# Patient Record
Sex: MALE | Race: BLACK OR AFRICAN AMERICAN | Employment: OTHER | URBAN - NONMETROPOLITAN AREA
[De-identification: names, ages, dates, MRNs, and addresses within clinical notes are randomized per-mention and may not be internally consistent; named-entity substitution may affect disease eponyms.]

---

## 2023-10-12 ENCOUNTER — HOSPITAL ENCOUNTER (EMERGENCY)
Facility: HOSPITAL | Age: 88
Discharge: HOME | End: 2023-10-12
Attending: STUDENT IN AN ORGANIZED HEALTH CARE EDUCATION/TRAINING PROGRAM
Payer: OTHER GOVERNMENT

## 2023-10-12 VITALS
SYSTOLIC BLOOD PRESSURE: 184 MMHG | TEMPERATURE: 98.3 F | HEART RATE: 84 BPM | DIASTOLIC BLOOD PRESSURE: 72 MMHG | RESPIRATION RATE: 19 BRPM | OXYGEN SATURATION: 97 %

## 2023-10-12 DIAGNOSIS — L03.116 CELLULITIS OF LEFT LOWER EXTREMITY: Primary | ICD-10-CM

## 2023-10-12 LAB
ALBUMIN SERPL-MCNC: 3.3 G/DL (ref 3.5–5)
ALBUMIN/GLOB SERPL: 0.8 (ref 1.1–2.2)
ALP SERPL-CCNC: 87 U/L (ref 45–117)
ALT SERPL-CCNC: 21 U/L (ref 12–78)
ANION GAP SERPL CALC-SCNC: 5 MMOL/L (ref 5–15)
AST SERPL W P-5'-P-CCNC: 21 U/L (ref 15–37)
BASOPHILS # BLD: 0 K/UL (ref 0–0.1)
BASOPHILS NFR BLD: 1 % (ref 0–1)
BILIRUB SERPL-MCNC: 0.7 MG/DL (ref 0.2–1)
BUN SERPL-MCNC: 21 MG/DL (ref 6–20)
BUN/CREAT SERPL: 21 (ref 12–20)
CA-I BLD-MCNC: 9.1 MG/DL (ref 8.5–10.1)
CHLORIDE SERPL-SCNC: 101 MMOL/L (ref 97–108)
CO2 SERPL-SCNC: 33 MMOL/L (ref 21–32)
CREAT SERPL-MCNC: 1.01 MG/DL (ref 0.7–1.3)
DIFFERENTIAL METHOD BLD: ABNORMAL
EOSINOPHIL # BLD: 0.3 K/UL (ref 0–0.4)
EOSINOPHIL NFR BLD: 7 % (ref 0–7)
ERYTHROCYTE [DISTWIDTH] IN BLOOD BY AUTOMATED COUNT: 16.4 % (ref 11.5–14.5)
GLOBULIN SER CALC-MCNC: 4.3 G/DL (ref 2–4)
GLUCOSE SERPL-MCNC: 115 MG/DL (ref 65–100)
HCT VFR BLD AUTO: 35.6 % (ref 36.6–50.3)
HGB BLD-MCNC: 10.9 G/DL (ref 12.1–17)
IMM GRANULOCYTES # BLD AUTO: 0 K/UL (ref 0–0.04)
IMM GRANULOCYTES NFR BLD AUTO: 0 % (ref 0–0.5)
LYMPHOCYTES # BLD: 1.3 K/UL (ref 0.8–3.5)
LYMPHOCYTES NFR BLD: 28 % (ref 12–49)
MCH RBC QN AUTO: 26.5 PG (ref 26–34)
MCHC RBC AUTO-ENTMCNC: 30.6 G/DL (ref 30–36.5)
MCV RBC AUTO: 86.6 FL (ref 80–99)
MONOCYTES # BLD: 0.4 K/UL (ref 0–1)
MONOCYTES NFR BLD: 10 % (ref 5–13)
NEUTS SEG # BLD: 2.4 K/UL (ref 1.8–8)
NEUTS SEG NFR BLD: 54 % (ref 32–75)
NRBC # BLD: 0 K/UL (ref 0–0.01)
NRBC BLD-RTO: 0 PER 100 WBC
PLATELET # BLD AUTO: 291 K/UL (ref 150–400)
PMV BLD AUTO: 9.7 FL (ref 8.9–12.9)
POTASSIUM SERPL-SCNC: 4.5 MMOL/L (ref 3.5–5.1)
PROT SERPL-MCNC: 7.6 G/DL (ref 6.4–8.2)
RBC # BLD AUTO: 4.11 M/UL (ref 4.1–5.7)
SODIUM SERPL-SCNC: 139 MMOL/L (ref 136–145)
WBC # BLD AUTO: 4.5 K/UL (ref 4.1–11.1)

## 2023-10-12 PROCEDURE — 36415 COLL VENOUS BLD VENIPUNCTURE: CPT

## 2023-10-12 PROCEDURE — 80053 COMPREHEN METABOLIC PANEL: CPT

## 2023-10-12 PROCEDURE — 85025 COMPLETE CBC W/AUTO DIFF WBC: CPT

## 2023-10-12 PROCEDURE — 99283 EMERGENCY DEPT VISIT LOW MDM: CPT

## 2023-10-12 RX ORDER — CEPHALEXIN 500 MG/1
500 CAPSULE ORAL 3 TIMES DAILY
Qty: 30 CAPSULE | Refills: 0 | Status: SHIPPED | OUTPATIENT
Start: 2023-10-12 | End: 2023-10-22

## 2023-10-12 ASSESSMENT — PAIN - FUNCTIONAL ASSESSMENT: PAIN_FUNCTIONAL_ASSESSMENT: 0-10

## 2023-10-12 ASSESSMENT — PAIN SCALES - GENERAL: PAINLEVEL_OUTOF10: 0

## 2023-10-12 NOTE — DISCHARGE INSTRUCTIONS
Thank you! Thank you for allowing me to care for you in the emergency department. It is my goal to provide you with excellent care. If you have not received excellent quality care, please ask to speak to the nurse manager. Please fill out the survey that will come to you by mail or email since we listen to your feedback! Below you will find a list of your tests from today's visit. Should you have any questions, please do not hesitate to call the emergency department.     Labs  Recent Results (from the past 12 hour(s))   CMP    Collection Time: 10/12/23  5:49 PM   Result Value Ref Range    Sodium 139 136 - 145 mmol/L    Potassium 4.5 3.5 - 5.1 mmol/L    Chloride 101 97 - 108 mmol/L    CO2 33 (H) 21 - 32 mmol/L    Anion Gap 5 5 - 15 mmol/L    Glucose 115 (H) 65 - 100 mg/dL    BUN 21 (H) 6 - 20 mg/dL    Creatinine 1.01 0.70 - 1.30 mg/dL    Bun/Cre Ratio 21 (H) 12 - 20      Est, Glom Filt Rate >60 >60 ml/min/1.73m2    Calcium 9.1 8.5 - 10.1 mg/dL    Total Bilirubin 0.7 0.2 - 1.0 mg/dL    AST 21 15 - 37 U/L    ALT 21 12 - 78 U/L    Alk Phosphatase 87 45 - 117 U/L    Total Protein 7.6 6.4 - 8.2 g/dL    Albumin 3.3 (L) 3.5 - 5.0 g/dL    Globulin 4.3 (H) 2.0 - 4.0 g/dL    Albumin/Globulin Ratio 0.8 (L) 1.1 - 2.2     CBC with Auto Differential    Collection Time: 10/12/23  5:49 PM   Result Value Ref Range    WBC 4.5 4.1 - 11.1 K/uL    RBC 4.11 4.10 - 5.70 M/uL    Hemoglobin 10.9 (L) 12.1 - 17.0 g/dL    Hematocrit 35.6 (L) 36.6 - 50.3 %    MCV 86.6 80.0 - 99.0 FL    MCH 26.5 26.0 - 34.0 PG    MCHC 30.6 30.0 - 36.5 g/dL    RDW 16.4 (H) 11.5 - 14.5 %    Platelets 474 885 - 653 K/uL    MPV 9.7 8.9 - 12.9 FL    Nucleated RBCs 0.0 0.0  WBC    nRBC 0.00 0.00 - 0.01 K/uL    Neutrophils % 54 32 - 75 %    Lymphocytes % 28 12 - 49 %    Monocytes % 10 5 - 13 %    Eosinophils % 7 0 - 7 %    Basophils % 1 0 - 1 %    Immature Granulocytes 0 0 - 0.5 %    Neutrophils Absolute 2.4 1.8 - 8.0 K/UL    Lymphocytes Absolute 1.3

## 2023-10-12 NOTE — ED PROVIDER NOTES
[facility] EMERGENCY DEPT  EMERGENCY DEPARTMENT HISTORY AND PHYSICAL EXAM      Date: 10/12/2023  Patient Name: Taj Waterman  MRN: [id number]  YOB: 1935  Date of evaluation: 10/12/2023  Provider: Giorgio Madrid MD   Note Started: 5:06 PM EDT 10/12/23    HISTORY OF PRESENT ILLNESS     Chief Complaint   Patient presents with    Leg Pain       History Provided By: Patient    HPI: Taj Waterman is a 81 y.o. male with history of heart failure and chronic lymphedema presents with bilateral lower extremity edema. Patient reports tenderness of both legs. He reports that there is some weeping of his left lower extremity that is causing his stocking to stick to his leg. He denies any fevers, chest pain, dyspnea. PAST MEDICAL HISTORY   Past Medical History:  No past medical history on file. Past Surgical History:  No past surgical history on file. Family History:  No family history on file. Social History: Allergies:  No Known Allergies    PCP: Nicci Sykes MD    Current Meds:   No current facility-administered medications for this encounter. No current outpatient medications on file. Social Determinants of Health:   Social Determinants of Health     Tobacco Use: Not on file   Alcohol Use: Not on file   Financial Resource Strain: Not on file   Food Insecurity: Not on file   Transportation Needs: Not on file   Physical Activity: Not on file   Stress: Not on file   Social Connections: Not on file   Intimate Partner Violence: Not on file   Depression: Not on file   Housing Stability: Not on file       PHYSICAL EXAM   Physical Exam  Constitutional:       General: He is not in acute distress. Appearance: He is obese. He is not ill-appearing or toxic-appearing. Cardiovascular:      Pulses: Normal pulses. Pulmonary:      Effort: Pulmonary effort is normal. No respiratory distress. Breath sounds: Normal breath sounds. Musculoskeletal:         General: Tenderness present.

## 2023-10-12 NOTE — ED TRIAGE NOTES
Pt reports left leg pain from knee to foot that is worse with palpation. Pt denies pain at this time rating 0/10. Pt ambulatory to room with standing walker.

## 2023-11-10 ENCOUNTER — HOSPITAL ENCOUNTER (EMERGENCY)
Facility: HOSPITAL | Age: 88
Discharge: HOME | End: 2023-11-10
Attending: EMERGENCY MEDICINE
Payer: OTHER GOVERNMENT

## 2023-11-10 VITALS
SYSTOLIC BLOOD PRESSURE: 156 MMHG | BODY MASS INDEX: 45.81 KG/M2 | HEIGHT: 69 IN | HEART RATE: 82 BPM | WEIGHT: 309.3 LBS | DIASTOLIC BLOOD PRESSURE: 71 MMHG | RESPIRATION RATE: 16 BRPM | TEMPERATURE: 98.2 F | OXYGEN SATURATION: 99 %
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
## 2023-11-10 PROCEDURE — 99283 EMERGENCY DEPT VISIT LOW MDM: CPT

## 2023-11-10 ASSESSMENT — ENCOUNTER SYMPTOMS
GASTROINTESTINAL NEGATIVE: 1
EYES NEGATIVE: 1
ALLERGIC/IMMUNOLOGIC NEGATIVE: 1
RESPIRATORY NEGATIVE: 1

## 2023-11-10 ASSESSMENT — LIFESTYLE VARIABLES
HOW MANY STANDARD DRINKS CONTAINING ALCOHOL DO YOU HAVE ON A TYPICAL DAY: PATIENT DOES NOT DRINK
HOW OFTEN DO YOU HAVE A DRINK CONTAINING ALCOHOL: NEVER

## 2023-11-10 ASSESSMENT — PAIN - FUNCTIONAL ASSESSMENT: PAIN_FUNCTIONAL_ASSESSMENT: NONE - DENIES PAIN

## 2023-11-11 NOTE — ED TRIAGE NOTES
Patient states he slid off toilet onto floor. Pt denies any pain or injury at this time. No LOC reported.

## 2023-11-11 NOTE — ED NOTES
Patient stable at time of discharge. Education and discharge paperwork reviewed with patient and family member, both verbalize understanding of same. Patient assisted into wheelchair, exits ED with family member.       Ashely Finch RN  11/10/23 1556

## 2023-11-13 ENCOUNTER — APPOINTMENT (OUTPATIENT)
Facility: HOSPITAL | Age: 88
DRG: 871 | End: 2023-11-13
Attending: HOSPITALIST
Payer: MEDICARE

## 2023-11-13 ENCOUNTER — APPOINTMENT (OUTPATIENT)
Facility: HOSPITAL | Age: 88
DRG: 871 | End: 2023-11-13
Payer: MEDICARE

## 2023-11-13 ENCOUNTER — HOSPITAL ENCOUNTER (INPATIENT)
Facility: HOSPITAL | Age: 88
LOS: 4 days | Discharge: HOME HEALTH | DRG: 871 | End: 2023-11-17
Attending: EMERGENCY MEDICINE | Admitting: HOSPITALIST
Payer: MEDICARE

## 2023-11-13 DIAGNOSIS — L03.119 CELLULITIS OF LOWER EXTREMITY, UNSPECIFIED LATERALITY: ICD-10-CM

## 2023-11-13 DIAGNOSIS — R60.9 PERIPHERAL EDEMA: ICD-10-CM

## 2023-11-13 DIAGNOSIS — R62.7 ADULT FAILURE TO THRIVE: ICD-10-CM

## 2023-11-13 DIAGNOSIS — R53.1 GENERALIZED WEAKNESS: Primary | ICD-10-CM

## 2023-11-13 PROBLEM — I87.2 VENOUS STASIS DERMATITIS OF BOTH LOWER EXTREMITIES: Status: ACTIVE | Noted: 2023-11-13

## 2023-11-13 PROBLEM — E66.01 MORBID OBESITY WITH BMI OF 45.0-49.9, ADULT (HCC): Status: ACTIVE | Noted: 2023-11-13

## 2023-11-13 PROBLEM — Z95.0 PRESENCE OF CARDIAC PACEMAKER: Status: ACTIVE | Noted: 2020-01-01

## 2023-11-13 PROBLEM — E11.9 TYPE 2 DIABETES MELLITUS WITHOUT COMPLICATION, WITH LONG-TERM CURRENT USE OF INSULIN (HCC): Status: ACTIVE | Noted: 2023-11-13

## 2023-11-13 PROBLEM — I50.9 HEART FAILURE (HCC): Status: ACTIVE | Noted: 2023-11-13

## 2023-11-13 PROBLEM — Z79.4 TYPE 2 DIABETES MELLITUS WITHOUT COMPLICATION, WITH LONG-TERM CURRENT USE OF INSULIN (HCC): Status: ACTIVE | Noted: 2023-11-13

## 2023-11-13 LAB
ALBUMIN SERPL-MCNC: 2.7 G/DL (ref 3.5–5)
ALBUMIN/GLOB SERPL: 0.5 (ref 1.1–2.2)
ALP SERPL-CCNC: 133 U/L (ref 45–117)
ALT SERPL-CCNC: 43 U/L (ref 12–78)
ANION GAP SERPL CALC-SCNC: 9 MMOL/L (ref 5–15)
APPEARANCE UR: CLEAR
AST SERPL W P-5'-P-CCNC: 52 U/L (ref 15–37)
BACTERIA URNS QL MICRO: NEGATIVE /HPF
BASOPHILS # BLD: 0.1 K/UL (ref 0–0.1)
BASOPHILS NFR BLD: 1 % (ref 0–1)
BILIRUB SERPL-MCNC: 1.1 MG/DL (ref 0.2–1)
BILIRUB UR QL CFM: NEGATIVE
BILIRUB UR QL: NEGATIVE
BNP SERPL-MCNC: 257 PG/ML
BUN SERPL-MCNC: 23 MG/DL (ref 6–20)
BUN/CREAT SERPL: 17 (ref 12–20)
CA-I BLD-MCNC: 9.3 MG/DL (ref 8.5–10.1)
CHLORIDE SERPL-SCNC: 98 MMOL/L (ref 97–108)
CO2 SERPL-SCNC: 28 MMOL/L (ref 21–32)
COLOR UR: ABNORMAL
CREAT SERPL-MCNC: 1.39 MG/DL (ref 0.7–1.3)
DIFFERENTIAL METHOD BLD: ABNORMAL
ECHO AO ROOT DIAM: 3.6 CM
ECHO AO ROOT INDEX: 1.46 CM/M2
ECHO AV AREA PEAK VELOCITY: 1.2 CM2
ECHO AV AREA VTI: 1.3 CM2
ECHO AV AREA/BSA PEAK VELOCITY: 0.5 CM2/M2
ECHO AV AREA/BSA VTI: 0.5 CM2/M2
ECHO AV MEAN GRADIENT: 19 MMHG
ECHO AV MEAN VELOCITY: 2.1 M/S
ECHO AV PEAK GRADIENT: 31 MMHG
ECHO AV PEAK VELOCITY: 2.8 M/S
ECHO AV VELOCITY RATIO: 0.36
ECHO AV VTI: 41.3 CM
ECHO BSA: 2.58 M2
ECHO BSA: 2.58 M2
ECHO EST RA PRESSURE: 3 MMHG
ECHO LA DIAMETER INDEX: 1.3 CM/M2
ECHO LA DIAMETER: 3.2 CM
ECHO LA TO AORTIC ROOT RATIO: 0.89
ECHO LA VOL A-L A2C: 31 ML (ref 18–58)
ECHO LA VOL A-L A4C: 49 ML (ref 18–58)
ECHO LA VOL BP: 41 ML (ref 18–58)
ECHO LA VOL MOD A2C: 30 ML (ref 18–58)
ECHO LA VOL MOD A4C: 39 ML (ref 18–58)
ECHO LA VOL/BSA BIPLANE: 17 ML/M2 (ref 16–34)
ECHO LA VOLUME AREA LENGTH: 47 ML
ECHO LA VOLUME INDEX A-L A2C: 13 ML/M2 (ref 16–34)
ECHO LA VOLUME INDEX A-L A4C: 20 ML/M2 (ref 16–34)
ECHO LA VOLUME INDEX AREA LENGTH: 19 ML/M2 (ref 16–34)
ECHO LA VOLUME INDEX MOD A2C: 12 ML/M2 (ref 16–34)
ECHO LA VOLUME INDEX MOD A4C: 16 ML/M2 (ref 16–34)
ECHO LV E' SEPTAL VELOCITY: 6 CM/S
ECHO LV EDV A2C: 73 ML
ECHO LV EDV A4C: 91 ML
ECHO LV EDV BP: 85 ML (ref 67–155)
ECHO LV EDV INDEX A4C: 37 ML/M2
ECHO LV EDV INDEX BP: 35 ML/M2
ECHO LV EDV NDEX A2C: 30 ML/M2
ECHO LV EJECTION FRACTION A2C: 41 %
ECHO LV EJECTION FRACTION A4C: 37 %
ECHO LV EJECTION FRACTION BIPLANE: 43 % (ref 55–100)
ECHO LV ESV A2C: 43 ML
ECHO LV ESV A4C: 57 ML
ECHO LV ESV BP: 48 ML (ref 22–58)
ECHO LV ESV INDEX A2C: 17 ML/M2
ECHO LV ESV INDEX A4C: 23 ML/M2
ECHO LV ESV INDEX BP: 20 ML/M2
ECHO LV FRACTIONAL SHORTENING: 25 % (ref 28–44)
ECHO LV INTERNAL DIMENSION DIASTOLE INDEX: 2.07 CM/M2
ECHO LV INTERNAL DIMENSION DIASTOLIC: 5.1 CM (ref 4.2–5.9)
ECHO LV INTERNAL DIMENSION SYSTOLIC INDEX: 1.54 CM/M2
ECHO LV INTERNAL DIMENSION SYSTOLIC: 3.8 CM
ECHO LV IVSD: 1.2 CM (ref 0.6–1)
ECHO LV MASS 2D: 241.2 G (ref 88–224)
ECHO LV MASS INDEX 2D: 98.1 G/M2 (ref 49–115)
ECHO LV POSTERIOR WALL DIASTOLIC: 1.2 CM (ref 0.6–1)
ECHO LV RELATIVE WALL THICKNESS RATIO: 0.47
ECHO LVOT AREA: 3.5 CM2
ECHO LVOT AV VTI INDEX: 0.4
ECHO LVOT DIAM: 2.1 CM
ECHO LVOT MEAN GRADIENT: 2 MMHG
ECHO LVOT PEAK GRADIENT: 4 MMHG
ECHO LVOT PEAK VELOCITY: 1 M/S
ECHO LVOT STROKE VOLUME INDEX: 23.5 ML/M2
ECHO LVOT SV: 57.8 ML
ECHO LVOT VTI: 16.7 CM
ECHO MV A VELOCITY: 1.07 M/S
ECHO MV E DECELERATION TIME (DT): 248.6 MS
ECHO MV E VELOCITY: 0.78 M/S
ECHO MV E/A RATIO: 0.73
ECHO RIGHT VENTRICULAR SYSTOLIC PRESSURE (RVSP): 32 MMHG
ECHO RV FREE WALL PEAK S': 11 CM/S
ECHO RV TAPSE: 1.3 CM (ref 1.7–?)
ECHO TV REGURGITANT MAX VELOCITY: 2.67 M/S
ECHO TV REGURGITANT PEAK GRADIENT: 29 MMHG
EKG ATRIAL RATE: 96 BPM
EKG DIAGNOSIS: NORMAL
EKG P AXIS: 27 DEGREES
EKG P-R INTERVAL: 189 MS
EKG Q-T INTERVAL: 382 MS
EKG QRS DURATION: 140 MS
EKG QTC CALCULATION (BAZETT): 473 MS
EKG R AXIS: 237 DEGREES
EKG T AXIS: 45 DEGREES
EKG VENTRICULAR RATE: 92 BPM
EOSINOPHIL # BLD: 0.1 K/UL (ref 0–0.4)
EOSINOPHIL NFR BLD: 1 % (ref 0–7)
ERYTHROCYTE [DISTWIDTH] IN BLOOD BY AUTOMATED COUNT: 16.4 % (ref 11.5–14.5)
FOLATE SERPL-MCNC: 5.7 NG/ML (ref 5–21)
GLOBULIN SER CALC-MCNC: 5.6 G/DL (ref 2–4)
GLUCOSE BLD STRIP.AUTO-MCNC: 124 MG/DL (ref 65–100)
GLUCOSE BLD STRIP.AUTO-MCNC: 130 MG/DL (ref 65–100)
GLUCOSE BLD STRIP.AUTO-MCNC: 134 MG/DL (ref 65–100)
GLUCOSE BLD STRIP.AUTO-MCNC: 142 MG/DL (ref 65–100)
GLUCOSE SERPL-MCNC: 133 MG/DL (ref 65–100)
GLUCOSE UR STRIP.AUTO-MCNC: >1000 MG/DL
HCT VFR BLD AUTO: 37 % (ref 36.6–50.3)
HGB BLD-MCNC: 11.8 G/DL (ref 12.1–17)
HGB UR QL STRIP: ABNORMAL
IMM GRANULOCYTES # BLD AUTO: 0.2 K/UL (ref 0–0.04)
IMM GRANULOCYTES NFR BLD AUTO: 2 % (ref 0–0.5)
IRON SATN MFR SERPL: 10 % (ref 20–50)
IRON SERPL-MCNC: 16 UG/DL (ref 35–150)
KETONES UR QL STRIP.AUTO: NEGATIVE MG/DL
LACTATE SERPL-SCNC: 1.6 MMOL/L (ref 0.4–2)
LEUKOCYTE ESTERASE UR QL STRIP.AUTO: ABNORMAL
LYMPHOCYTES # BLD: 1.6 K/UL (ref 0.8–3.5)
LYMPHOCYTES NFR BLD: 18 % (ref 12–49)
MAGNESIUM SERPL-MCNC: 1.9 MG/DL (ref 1.6–2.4)
MCH RBC QN AUTO: 26.4 PG (ref 26–34)
MCHC RBC AUTO-ENTMCNC: 31.9 G/DL (ref 30–36.5)
MCV RBC AUTO: 82.8 FL (ref 80–99)
MONOCYTES # BLD: 0.5 K/UL (ref 0–1)
MONOCYTES NFR BLD: 5 % (ref 5–13)
NEUTS SEG # BLD: 6.5 K/UL (ref 1.8–8)
NEUTS SEG NFR BLD: 73 % (ref 32–75)
NITRITE UR QL STRIP.AUTO: NEGATIVE
NRBC # BLD: 0 K/UL (ref 0–0.01)
NRBC BLD-RTO: 0 PER 100 WBC
PERFORMED BY:: ABNORMAL
PH UR STRIP: 6 (ref 5–8)
PLATELET # BLD AUTO: 285 K/UL (ref 150–400)
PMV BLD AUTO: 10.4 FL (ref 8.9–12.9)
POTASSIUM SERPL-SCNC: 3.8 MMOL/L (ref 3.5–5.1)
PROT SERPL-MCNC: 8.3 G/DL (ref 6.4–8.2)
PROT UR STRIP-MCNC: 30 MG/DL
RBC # BLD AUTO: 4.47 M/UL (ref 4.1–5.7)
RBC #/AREA URNS HPF: ABNORMAL /HPF (ref 0–3)
SODIUM SERPL-SCNC: 135 MMOL/L (ref 136–145)
SP GR UR REFRACTOMETRY: 1.02 (ref 1–1.03)
TIBC SERPL-MCNC: 163 UG/DL (ref 250–450)
TROPONIN I SERPL HS-MCNC: 17 NG/L (ref 0–76)
TROPONIN I SERPL HS-MCNC: 25 NG/L (ref 0–76)
URINE CULTURE IF INDICATED: ABNORMAL
UROBILINOGEN UR QL STRIP.AUTO: 1 EU/DL (ref 0.2–1)
VIT B12 SERPL-MCNC: 1104 PG/ML (ref 193–986)
WBC # BLD AUTO: 8.9 K/UL (ref 4.1–11.1)
WBC URNS QL MICRO: ABNORMAL /HPF (ref 0–5)

## 2023-11-13 PROCEDURE — 80307 DRUG TEST PRSMV CHEM ANLYZR: CPT

## 2023-11-13 PROCEDURE — 6370000000 HC RX 637 (ALT 250 FOR IP): Performed by: HOSPITALIST

## 2023-11-13 PROCEDURE — 71045 X-RAY EXAM CHEST 1 VIEW: CPT

## 2023-11-13 PROCEDURE — 83036 HEMOGLOBIN GLYCOSYLATED A1C: CPT

## 2023-11-13 PROCEDURE — 83605 ASSAY OF LACTIC ACID: CPT

## 2023-11-13 PROCEDURE — 82746 ASSAY OF FOLIC ACID SERUM: CPT

## 2023-11-13 PROCEDURE — 97535 SELF CARE MNGMENT TRAINING: CPT

## 2023-11-13 PROCEDURE — G0378 HOSPITAL OBSERVATION PER HR: HCPCS

## 2023-11-13 PROCEDURE — 97530 THERAPEUTIC ACTIVITIES: CPT

## 2023-11-13 PROCEDURE — 97161 PT EVAL LOW COMPLEX 20 MIN: CPT

## 2023-11-13 PROCEDURE — 99285 EMERGENCY DEPT VISIT HI MDM: CPT

## 2023-11-13 PROCEDURE — 83735 ASSAY OF MAGNESIUM: CPT

## 2023-11-13 PROCEDURE — 83880 ASSAY OF NATRIURETIC PEPTIDE: CPT

## 2023-11-13 PROCEDURE — C8929 TTE W OR WO FOL WCON,DOPPLER: HCPCS

## 2023-11-13 PROCEDURE — 6360000002 HC RX W HCPCS: Performed by: HOSPITALIST

## 2023-11-13 PROCEDURE — 74176 CT ABD & PELVIS W/O CONTRAST: CPT

## 2023-11-13 PROCEDURE — 80053 COMPREHEN METABOLIC PANEL: CPT

## 2023-11-13 PROCEDURE — 97165 OT EVAL LOW COMPLEX 30 MIN: CPT

## 2023-11-13 PROCEDURE — 82607 VITAMIN B-12: CPT

## 2023-11-13 PROCEDURE — 1100000000 HC RM PRIVATE

## 2023-11-13 PROCEDURE — 2500000003 HC RX 250 WO HCPCS: Performed by: HOSPITALIST

## 2023-11-13 PROCEDURE — 84484 ASSAY OF TROPONIN QUANT: CPT

## 2023-11-13 PROCEDURE — 94640 AIRWAY INHALATION TREATMENT: CPT

## 2023-11-13 PROCEDURE — 6360000004 HC RX CONTRAST MEDICATION: Performed by: INTERNAL MEDICINE

## 2023-11-13 PROCEDURE — 93970 EXTREMITY STUDY: CPT

## 2023-11-13 PROCEDURE — 93005 ELECTROCARDIOGRAM TRACING: CPT | Performed by: EMERGENCY MEDICINE

## 2023-11-13 PROCEDURE — 81001 URINALYSIS AUTO W/SCOPE: CPT

## 2023-11-13 PROCEDURE — 2580000003 HC RX 258: Performed by: HOSPITALIST

## 2023-11-13 PROCEDURE — 82962 GLUCOSE BLOOD TEST: CPT

## 2023-11-13 PROCEDURE — 85025 COMPLETE CBC W/AUTO DIFF WBC: CPT

## 2023-11-13 PROCEDURE — 83540 ASSAY OF IRON: CPT

## 2023-11-13 RX ORDER — INSULIN LISPRO 100 [IU]/ML
0.03 INJECTION, SOLUTION INTRAVENOUS; SUBCUTANEOUS
Status: DISCONTINUED | OUTPATIENT
Start: 2023-11-13 | End: 2023-11-17 | Stop reason: HOSPADM

## 2023-11-13 RX ORDER — ASPIRIN 81 MG/1
81 TABLET ORAL DAILY
Status: DISCONTINUED | OUTPATIENT
Start: 2023-11-13 | End: 2023-11-17 | Stop reason: HOSPADM

## 2023-11-13 RX ORDER — DOCUSATE SODIUM 100 MG/1
100 CAPSULE, LIQUID FILLED ORAL 2 TIMES DAILY
Status: DISCONTINUED | OUTPATIENT
Start: 2023-11-13 | End: 2023-11-17 | Stop reason: HOSPADM

## 2023-11-13 RX ORDER — ONDANSETRON 4 MG/1
4 TABLET, ORALLY DISINTEGRATING ORAL EVERY 8 HOURS PRN
Status: DISCONTINUED | OUTPATIENT
Start: 2023-11-13 | End: 2023-11-17 | Stop reason: HOSPADM

## 2023-11-13 RX ORDER — FLUTICASONE PROPIONATE AND SALMETEROL 100; 50 UG/1; UG/1
1 POWDER RESPIRATORY (INHALATION) EVERY 12 HOURS
COMMUNITY

## 2023-11-13 RX ORDER — SODIUM CHLORIDE 0.9 % (FLUSH) 0.9 %
5-40 SYRINGE (ML) INJECTION EVERY 12 HOURS SCHEDULED
Status: DISCONTINUED | OUTPATIENT
Start: 2023-11-13 | End: 2023-11-17 | Stop reason: HOSPADM

## 2023-11-13 RX ORDER — FUROSEMIDE 20 MG/1
20 TABLET ORAL DAILY
Status: DISCONTINUED | OUTPATIENT
Start: 2023-11-13 | End: 2023-11-14

## 2023-11-13 RX ORDER — ACETAMINOPHEN 325 MG/1
650 TABLET ORAL EVERY 6 HOURS PRN
Status: DISCONTINUED | OUTPATIENT
Start: 2023-11-13 | End: 2023-11-17 | Stop reason: HOSPADM

## 2023-11-13 RX ORDER — PETROLATUM,WHITE/LANOLIN
OINTMENT (GRAM) TOPICAL 2 TIMES DAILY
Status: DISCONTINUED | OUTPATIENT
Start: 2023-11-13 | End: 2023-11-17 | Stop reason: HOSPADM

## 2023-11-13 RX ORDER — SENNOSIDES A AND B 8.6 MG/1
1 TABLET, FILM COATED ORAL NIGHTLY
Status: DISCONTINUED | OUTPATIENT
Start: 2023-11-13 | End: 2023-11-17 | Stop reason: HOSPADM

## 2023-11-13 RX ORDER — ENOXAPARIN SODIUM 100 MG/ML
30 INJECTION SUBCUTANEOUS 2 TIMES DAILY
Status: DISCONTINUED | OUTPATIENT
Start: 2023-11-13 | End: 2023-11-17 | Stop reason: HOSPADM

## 2023-11-13 RX ORDER — ATORVASTATIN CALCIUM 80 MG/1
80 TABLET, FILM COATED ORAL DAILY
COMMUNITY

## 2023-11-13 RX ORDER — CHOLECALCIFEROL (VITAMIN D3) 125 MCG
1000 CAPSULE ORAL DAILY
Status: ON HOLD | COMMUNITY
End: 2023-11-17 | Stop reason: HOSPADM

## 2023-11-13 RX ORDER — ASPIRIN 81 MG/1
81 TABLET ORAL DAILY
COMMUNITY

## 2023-11-13 RX ORDER — DEXTROSE MONOHYDRATE 100 MG/ML
INJECTION, SOLUTION INTRAVENOUS CONTINUOUS PRN
Status: DISCONTINUED | OUTPATIENT
Start: 2023-11-13 | End: 2023-11-17 | Stop reason: HOSPADM

## 2023-11-13 RX ORDER — ONDANSETRON 2 MG/ML
4 INJECTION INTRAMUSCULAR; INTRAVENOUS EVERY 6 HOURS PRN
Status: DISCONTINUED | OUTPATIENT
Start: 2023-11-13 | End: 2023-11-17 | Stop reason: HOSPADM

## 2023-11-13 RX ORDER — ACETAMINOPHEN 650 MG/1
650 SUPPOSITORY RECTAL EVERY 6 HOURS PRN
Status: DISCONTINUED | OUTPATIENT
Start: 2023-11-13 | End: 2023-11-17 | Stop reason: HOSPADM

## 2023-11-13 RX ORDER — SODIUM CHLORIDE 9 MG/ML
INJECTION, SOLUTION INTRAVENOUS PRN
Status: DISCONTINUED | OUTPATIENT
Start: 2023-11-13 | End: 2023-11-17 | Stop reason: HOSPADM

## 2023-11-13 RX ORDER — LIDOCAINE 4 G/G
1 PATCH TOPICAL EVERY 12 HOURS
Status: DISCONTINUED | OUTPATIENT
Start: 2023-11-13 | End: 2023-11-17 | Stop reason: HOSPADM

## 2023-11-13 RX ORDER — ALBUTEROL SULFATE 90 UG/1
1 AEROSOL, METERED RESPIRATORY (INHALATION) EVERY 6 HOURS PRN
COMMUNITY

## 2023-11-13 RX ORDER — FUROSEMIDE 20 MG/1
20 TABLET ORAL DAILY
Status: ON HOLD | COMMUNITY
Start: 2020-06-03 | End: 2023-11-17 | Stop reason: SDUPTHER

## 2023-11-13 RX ORDER — 0.9 % SODIUM CHLORIDE 0.9 %
250 INTRAVENOUS SOLUTION INTRAVENOUS ONCE
Status: COMPLETED | OUTPATIENT
Start: 2023-11-13 | End: 2023-11-13

## 2023-11-13 RX ORDER — PETROLATUM,WHITE
1 OINTMENT IN PACKET (GRAM) TOPICAL PRN
Status: ON HOLD | COMMUNITY
End: 2023-11-17 | Stop reason: HOSPADM

## 2023-11-13 RX ORDER — INSULIN LISPRO 100 [IU]/ML
0-4 INJECTION, SOLUTION INTRAVENOUS; SUBCUTANEOUS
Status: DISCONTINUED | OUTPATIENT
Start: 2023-11-13 | End: 2023-11-17 | Stop reason: HOSPADM

## 2023-11-13 RX ORDER — LANOLIN ALCOHOL/MO/W.PET/CERES
400 CREAM (GRAM) TOPICAL DAILY
Status: DISCONTINUED | OUTPATIENT
Start: 2023-11-14 | End: 2023-11-17 | Stop reason: HOSPADM

## 2023-11-13 RX ORDER — METOPROLOL SUCCINATE 25 MG/1
12.5 TABLET, EXTENDED RELEASE ORAL DAILY
Status: DISCONTINUED | OUTPATIENT
Start: 2023-11-13 | End: 2023-11-16

## 2023-11-13 RX ORDER — INSULIN LISPRO 100 [IU]/ML
0-4 INJECTION, SOLUTION INTRAVENOUS; SUBCUTANEOUS NIGHTLY
Status: DISCONTINUED | OUTPATIENT
Start: 2023-11-13 | End: 2023-11-17 | Stop reason: HOSPADM

## 2023-11-13 RX ORDER — SODIUM CHLORIDE 0.9 % (FLUSH) 0.9 %
5-40 SYRINGE (ML) INJECTION PRN
Status: DISCONTINUED | OUTPATIENT
Start: 2023-11-13 | End: 2023-11-17 | Stop reason: HOSPADM

## 2023-11-13 RX ADMIN — INSULIN LISPRO 4 UNITS: 100 INJECTION, SOLUTION INTRAVENOUS; SUBCUTANEOUS at 13:37

## 2023-11-13 RX ADMIN — MICONAZOLE NITRATE: 20 POWDER TOPICAL at 22:10

## 2023-11-13 RX ADMIN — INSULIN LISPRO 4 UNITS: 100 INJECTION, SOLUTION INTRAVENOUS; SUBCUTANEOUS at 17:03

## 2023-11-13 RX ADMIN — ENOXAPARIN SODIUM 30 MG: 100 INJECTION SUBCUTANEOUS at 10:17

## 2023-11-13 RX ADMIN — Medication 2 PUFF: at 13:32

## 2023-11-13 RX ADMIN — SODIUM CHLORIDE, PRESERVATIVE FREE 10 ML: 5 INJECTION INTRAVENOUS at 10:21

## 2023-11-13 RX ADMIN — SODIUM CHLORIDE 250 ML: 9 INJECTION, SOLUTION INTRAVENOUS at 17:04

## 2023-11-13 RX ADMIN — Medication: at 17:05

## 2023-11-13 RX ADMIN — SODIUM CHLORIDE, PRESERVATIVE FREE 10 ML: 5 INJECTION INTRAVENOUS at 22:05

## 2023-11-13 RX ADMIN — INSULIN LISPRO 4 UNITS: 100 INJECTION, SOLUTION INTRAVENOUS; SUBCUTANEOUS at 10:21

## 2023-11-13 RX ADMIN — ACETAMINOPHEN 650 MG: 325 TABLET ORAL at 20:21

## 2023-11-13 RX ADMIN — DOCUSATE SODIUM 100 MG: 100 CAPSULE, LIQUID FILLED ORAL at 22:04

## 2023-11-13 RX ADMIN — ASPIRIN 81 MG: 81 TABLET, COATED ORAL at 13:37

## 2023-11-13 RX ADMIN — SENNOSIDES 8.6 MG: 8.6 TABLET, FILM COATED ORAL at 22:04

## 2023-11-13 RX ADMIN — Medication: at 10:18

## 2023-11-13 RX ADMIN — MICONAZOLE NITRATE: 20 POWDER TOPICAL at 10:18

## 2023-11-13 RX ADMIN — DOCUSATE SODIUM 100 MG: 100 CAPSULE, LIQUID FILLED ORAL at 10:19

## 2023-11-13 RX ADMIN — ENOXAPARIN SODIUM 30 MG: 100 INJECTION SUBCUTANEOUS at 22:04

## 2023-11-13 RX ADMIN — Medication 2 PUFF: at 19:45

## 2023-11-13 RX ADMIN — PERFLUTREN 1.5 ML: 6.52 INJECTION, SUSPENSION INTRAVENOUS at 09:58

## 2023-11-13 ASSESSMENT — ENCOUNTER SYMPTOMS
ABDOMINAL DISTENTION: 1
RESPIRATORY NEGATIVE: 1
CONSTIPATION: 1
NAUSEA: 0
EYES NEGATIVE: 1
STRIDOR: 0
GASTROINTESTINAL NEGATIVE: 1
SHORTNESS OF BREATH: 0
ALLERGIC/IMMUNOLOGIC NEGATIVE: 1
COUGH: 0
VOMITING: 0
ABDOMINAL PAIN: 0
WHEEZING: 0
CHOKING: 0
DIARRHEA: 0

## 2023-11-13 ASSESSMENT — PAIN DESCRIPTION - FREQUENCY: FREQUENCY: INTERMITTENT

## 2023-11-13 ASSESSMENT — PAIN SCALES - GENERAL
PAINLEVEL_OUTOF10: 8
PAINLEVEL_OUTOF10: 7
PAINLEVEL_OUTOF10: 6
PAINLEVEL_OUTOF10: 5
PAINLEVEL_OUTOF10: 5

## 2023-11-13 ASSESSMENT — PAIN DESCRIPTION - ONSET: ONSET: ON-GOING

## 2023-11-13 ASSESSMENT — PAIN DESCRIPTION - ORIENTATION
ORIENTATION: LEFT;LOWER
ORIENTATION: LOWER
ORIENTATION: LOWER

## 2023-11-13 ASSESSMENT — PAIN DESCRIPTION - PAIN TYPE: TYPE: ACUTE PAIN

## 2023-11-13 ASSESSMENT — PAIN - FUNCTIONAL ASSESSMENT
PAIN_FUNCTIONAL_ASSESSMENT: 0-10
PAIN_FUNCTIONAL_ASSESSMENT: PREVENTS OR INTERFERES SOME ACTIVE ACTIVITIES AND ADLS

## 2023-11-13 ASSESSMENT — PAIN DESCRIPTION - LOCATION
LOCATION: BACK

## 2023-11-13 ASSESSMENT — PAIN DESCRIPTION - DESCRIPTORS
DESCRIPTORS: ACHING
DESCRIPTORS: STABBING

## 2023-11-13 NOTE — PLAN OF CARE
Problem: Skin/Tissue Integrity  Goal: Absence of new skin breakdown  Description: 1. Monitor for areas of redness and/or skin breakdown  2. Assess vascular access sites hourly  3. Every 4-6 hours minimum:  Change oxygen saturation probe site  4. Every 4-6 hours:  If on nasal continuous positive airway pressure, respiratory therapy assess nares and determine need for appliance change or resting period.   Outcome: Progressing     Problem: Chronic Conditions and Co-morbidities  Goal: Patient's chronic conditions and co-morbidity symptoms are monitored and maintained or improved  Outcome: Progressing     Problem: Pain  Goal: Verbalizes/displays adequate comfort level or baseline comfort level  Outcome: Progressing     Problem: Safety - Adult  Goal: Free from fall injury  Outcome: Progressing     Problem: ABCDS Injury Assessment  Goal: Absence of physical injury  Outcome: Progressing

## 2023-11-13 NOTE — PROGRESS NOTES
0800: Patient arrived on floor via stretcher, stable, abdominal skin folds and groin excoriation, bilateral lower extremities weeping, +3 edema,and foul smelling. Patient is oriented, complaints to pain to the lower left side of back, 8 of 10 when being moved. All concerns addressed and call light within reach.

## 2023-11-13 NOTE — ASSESSMENT & PLAN NOTE
- Baseline morbid obesity (BMI>45) with progressive weakness and impaired ambulation reported  - No acute electrolyte dysfunction, metabolic derangement or infectious etiology identified  - Underlying obesity and deconditioning the suspected etiology  - PTOT and SW eval requested

## 2023-11-13 NOTE — ED TRIAGE NOTES
EMS was called out because pt states that he has pain all over and has been unable to move. Pt states that he has been in a chair in his home since at least Friday. He has not been able to move out of the chair so he has been urinating in the chair and arrives saturated in urine. Pt states that he has been having the pain all over for a few weeks, but being unable to walk or move has been recent. Pt is very edematous in his lower extremities, with +3 pitting edema in his bilateral feet and lower extremities. Pt states that he has a nephew that comes and helps him out but the nephew is unable to help assist him with ambulating and transferring. Pt states that he had been eating and drinking like normal. Pt denies any maltreatment, but pt is unable to fully do his ADLs and care for himself.

## 2023-11-13 NOTE — ASSESSMENT & PLAN NOTE
- Long term control unclear w/o acute inpt hyperglycemia noted  - Continue Wt based prandial Novolog for inpt management  - Resume low CHO diet control

## 2023-11-13 NOTE — PROGRESS NOTES
[facility] medical records request form, waiting for return fax, also spoke with and waiting for medication list from [facility], Arron Ratliff contacted to request pacemaker interrogation.

## 2023-11-13 NOTE — ASSESSMENT & PLAN NOTE
- Chronic presentation w/ baseline unclear and no reports of underlying LV/RV function  - Continue low dose diuretic with follow up TTE ordered  - Additional ET care and lymphedema management pending AM Hospitalist review

## 2023-11-13 NOTE — ED NOTES
[facility] EMERGENCY DEPT  EMERGENCY DEPARTMENT ENCOUNTER      Pt Name: Jose Hurt  MRN: [id number]  [address] [facility] 1935  Date of evaluation: 11/13/2023  Provider: Craig Jeong. MD Estuardo    CHIEF COMPLAINT     No chief complaint on file. HISTORY OF PRESENT ILLNESS   (Location/Symptom, Timing/Onset, Context/Setting, Quality, Duration, Modifying Factors, Severity)  Note limiting factors. Jose Hurt is a 81 y.o. male with morbid obesity who presents to the emergency department with complaint of general weakness. He has been unable to ambulate for 5 days. He normally gets around on a walker. He has chronic back pain. He was picked up at home sitting in urine. He denies shortness of breath or chest pain. Nursing Notes were reviewed. REVIEW OF SYSTEMS    (2-9 systems for level 4, 10 or more for level 5)     Review of Systems   Constitutional: Negative. HENT: Negative. Respiratory: Negative. Cardiovascular:  Positive for leg swelling. Negative for chest pain. Gastrointestinal: Negative. Negative for abdominal pain and vomiting. Neurological:  Positive for weakness. Hematological: Negative. Except as noted above the remainder of the review of systems was reviewed and negative. PAST MEDICAL HISTORY   No past medical history on file. SURGICAL HISTORY     No past surgical history on file. CURRENT MEDICATIONS       Previous Medications    No medications on file       ALLERGIES     Patient has no known allergies. FAMILY HISTORY     No family history on file.        SOCIAL HISTORY       Social History     Socioeconomic History    Marital status: Single   Tobacco Use    Smoking status: Never    Smokeless tobacco: Never   Vaping Use    Vaping Use: Never used   Substance and Sexual Activity    Alcohol use: Not Currently    Drug use: Never       SCREENINGS                                                 PHYSICAL EXAM    (up to 7 for level 4, 8 or more for level 5)     ED Triage

## 2023-11-13 NOTE — H&P
distress. , morbidly obese   Head:  Normocephalic, without obvious abnormality, atraumatic. Eyes:  Conjunctivae/corneas clear. Pupils equal, round, reactive to light. Extraocular movements intact. Lungs: Bilateral air entry markedly diminished at bases  Chest wall: No tenderness or deformity. Heart:  Regular rate and rhythm, S1, S2 normal, no murmur, click, rub, or gallop. Abdomen:   Soft, obese distended nontender bowel sounds normal. No masses. No organomegaly. Back:  No spine tenderness to palpation  Extremities: 3-4+ acute on chronic edema with stasis noted  Pulses: Symmetric all extremities. Skin: Skin color, texture, turgor normal.   Lymph nodes: Cervical nodes normal.  Neurologic: CNII-XII intact. Normal strength, sensation, and reflexes throughout.       Labs:  Recent Results (from the past 24 hour(s))   CBC with Auto Differential    Collection Time: 11/13/23  4:56 AM   Result Value Ref Range    WBC 8.9 4.1 - 11.1 K/uL    RBC 4.47 4.10 - 5.70 M/uL    Hemoglobin 11.8 (L) 12.1 - 17.0 g/dL    Hematocrit 37.0 36.6 - 50.3 %    MCV 82.8 80.0 - 99.0 FL    MCH 26.4 26.0 - 34.0 PG    MCHC 31.9 30.0 - 36.5 g/dL    RDW 16.4 (H) 11.5 - 14.5 %    Platelets 359 411 - 668 K/uL    MPV 10.4 8.9 - 12.9 FL    Nucleated RBCs 0.0 0.0  WBC    nRBC 0.00 0.00 - 0.01 K/uL    Neutrophils % 73 32 - 75 %    Lymphocytes % 18 12 - 49 %    Monocytes % 5 5 - 13 %    Eosinophils % 1 0 - 7 %    Basophils % 1 0 - 1 %    Immature Granulocytes 2 (H) 0 - 0.5 %    Neutrophils Absolute 6.5 1.8 - 8.0 K/UL    Lymphocytes Absolute 1.6 0.8 - 3.5 K/UL    Monocytes Absolute 0.5 0.0 - 1.0 K/UL    Eosinophils Absolute 0.1 0.0 - 0.4 K/UL    Basophils Absolute 0.1 0.0 - 0.1 K/UL    Absolute Immature Granulocyte 0.2 (H) 0.00 - 0.04 K/UL    Differential Type AUTOMATED     CMP    Collection Time: 11/13/23  4:56 AM   Result Value Ref Range    Sodium 135 (L) 136 - 145 mmol/L    Potassium 3.8 3.5 - 5.1 mmol/L    Chloride 98 97 - 108 mmol/L    CO2 28 21 A4C 91 mL    LV EDV BP 85 67 - 155 mL    LV ESV A2C 43 mL    LV ESV A4C 57 mL    LV ESV BP 48 22 - 58 mL    LVOT Peak Gradient 4 mmHg    LVOT Mean Gradient 2 mmHg    LVOT SV 54.9 ml    LVOT Peak Velocity 1.0 m/s    LVOT VTI 16.7 cm    RV Free Wall Peak S' 11 cm/s    LA Diameter 3.2 cm    LA Volume A/L 47 mL    LA Volume A-L A4C 31 18 - 58 mL    LA Volume A-L A4C 49 18 - 58 mL    LA Volume MOD A2C 30 18 - 58 mL    LA Volume MOD A4C 39 18 - 58 mL    LA Volume BP 41 18 - 58 mL    AV Area by Peak Velocity 1.2 cm2    AV Area by VTI 1.3 cm2    AV Peak Gradient 31 mmHg    AV Mean Gradient 19 mmHg    AV Peak Velocity 2.8 m/s    AV Mean Velocity 2.1 m/s    AV VTI 41.3 cm    MV A Velocity 1.07 m/s    MV E Wave Deceleration Time 248.6 ms    MV E Velocity 0.78 m/s    LV E' Septal Velocity 6 cm/s    TAPSE 1.3 (A) 1.7 cm    TR Peak Gradient 29 mmHg    TR Max Velocity 2.67 m/s    Aortic Root 3.6 cm       Imaging:  XR CHEST PORTABLE    Result Date: 11/13/2023  No acute findings.        Assessment & Plan:     Hospital Problems             Last Modified POA    Adult failure to thrive 11/13/2023 Yes    Type 2 diabetes mellitus without complication, with long-term current use of insulin ([address]) 11/13/2023 Yes    Venous stasis dermatitis of both lower extremities 11/13/2023 Yes    Morbid obesity with BMI of 45.0-49.9, adult ([address]) 11/13/2023 Yes          Acute on chronic HFrEF exacerbation   - No hx of stents s/p st diamond defib / pcm placement and will need interrogation   - obtain last office notes from pcp Dr. Melvin Feng   - denies any chest pain   - troponin is negative   - follow up official TTE   - fluid restrict 1.2L per day  - monitor weights and strict I/O   - noted at home on 40mg lasix but 1/2 tab twice daily but states he takes whole tab daily   - blood pressure low in acute care and monitor vitals closely       Acute on chronic pain   - complains of pain with minimal movement secondary to lower extremity swelling but also has chronic

## 2023-11-13 NOTE — PROGRESS NOTES
Pt is an obese 88yr old male who presented via EMS w/ complaints of progressive weakness. Report obtained from the ED who indicated that the patient  had not been able to get out of his chair for ~ 1 wk and was found covered in Urine and feces by EMS. PT was afebrile, hemodynamically stable and in no resp distress. Lab work was unremarkable and admission requested by ED for social reasons.   Pt not directly evaluated with full HP and additional order management per daytime hospitalist team    Adult failure to thrive  - Baseline morbid obesity (BMI>45) with progressive weakness and impaired ambulation reported  - No acute electrolyte dysfunction, metabolic derangement or infectious etiology identified  - Underlying obesity and deconditioning the suspected etiology  - PTOT and SW eval requested    Venous stasis dermatitis of both lower extremities  - Chronic presentation w/ baseline unclear and no reports of underlying LV/RV function  - Continue low dose diuretic with follow up TTE ordered  - Additional ET care and lymphedema management pending AM Hospitalist review    Type 2 diabetes mellitus without complication, with long-term current use of insulin ([address])  - Long term control unclear w/o acute inpt hyperglycemia noted  - Continue Wt based prandial Novolog for inpt management  - Resume low CHO diet control

## 2023-11-14 ENCOUNTER — APPOINTMENT (OUTPATIENT)
Facility: HOSPITAL | Age: 88
DRG: 871 | End: 2023-11-14
Payer: MEDICARE

## 2023-11-14 PROBLEM — D72.829 LEUKOCYTOSIS: Status: ACTIVE | Noted: 2023-11-14

## 2023-11-14 PROBLEM — R17 SERUM TOTAL BILIRUBIN ELEVATED: Status: ACTIVE | Noted: 2023-11-14

## 2023-11-14 PROBLEM — R74.01 ELEVATED AST (SGOT): Status: ACTIVE | Noted: 2023-11-14

## 2023-11-14 PROBLEM — I95.9 HYPOTENSION: Status: ACTIVE | Noted: 2023-11-14

## 2023-11-14 PROBLEM — N17.9 AKI (ACUTE KIDNEY INJURY) (HCC): Status: ACTIVE | Noted: 2023-11-14

## 2023-11-14 PROBLEM — L03.90 CELLULITIS: Status: ACTIVE | Noted: 2023-11-14

## 2023-11-14 LAB
ALBUMIN SERPL-MCNC: 2 G/DL (ref 3.5–5)
ALBUMIN/GLOB SERPL: 0.4 (ref 1.1–2.2)
ALP SERPL-CCNC: 153 U/L (ref 45–117)
ALT SERPL-CCNC: 32 U/L (ref 12–78)
AMPHET UR QL SCN: NEGATIVE
ANION GAP SERPL CALC-SCNC: 5 MMOL/L (ref 5–15)
AST SERPL W P-5'-P-CCNC: 59 U/L (ref 15–37)
BARBITURATES UR QL SCN: NEGATIVE
BASOPHILS # BLD: 0.1 K/UL (ref 0–0.1)
BASOPHILS NFR BLD: 0 % (ref 0–1)
BENZODIAZ UR QL: NEGATIVE
BILIRUB SERPL-MCNC: 1.8 MG/DL (ref 0.2–1)
BNP SERPL-MCNC: 1872 PG/ML
BUN SERPL-MCNC: 28 MG/DL (ref 6–20)
BUN/CREAT SERPL: 19 (ref 12–20)
CA-I BLD-MCNC: 8.6 MG/DL (ref 8.5–10.1)
CANNABINOIDS UR QL SCN: NEGATIVE
CHLORIDE SERPL-SCNC: 101 MMOL/L (ref 97–108)
CO2 SERPL-SCNC: 30 MMOL/L (ref 21–32)
COCAINE UR QL SCN: NEGATIVE
CREAT SERPL-MCNC: 1.48 MG/DL (ref 0.7–1.3)
CRP SERPL-MCNC: 26.99 MG/DL (ref 0–0.6)
DIFFERENTIAL METHOD BLD: ABNORMAL
EOSINOPHIL # BLD: 0.1 K/UL (ref 0–0.4)
EOSINOPHIL NFR BLD: 1 % (ref 0–7)
ERYTHROCYTE [DISTWIDTH] IN BLOOD BY AUTOMATED COUNT: 16.5 % (ref 11.5–14.5)
EST. AVERAGE GLUCOSE BLD GHB EST-MCNC: 131 MG/DL
ETHANOL SERPL-MCNC: <10 MG/DL (ref 0–0.08)
GLOBULIN SER CALC-MCNC: 4.7 G/DL (ref 2–4)
GLUCOSE BLD STRIP.AUTO-MCNC: 114 MG/DL (ref 65–100)
GLUCOSE BLD STRIP.AUTO-MCNC: 115 MG/DL (ref 65–100)
GLUCOSE BLD STRIP.AUTO-MCNC: 118 MG/DL (ref 65–100)
GLUCOSE BLD STRIP.AUTO-MCNC: 154 MG/DL (ref 65–100)
GLUCOSE SERPL-MCNC: 96 MG/DL (ref 65–100)
HBA1C MFR BLD: 6.2 % (ref 4–5.6)
HCT VFR BLD AUTO: 30.4 % (ref 36.6–50.3)
HGB BLD-MCNC: 10 G/DL (ref 12.1–17)
IMM GRANULOCYTES # BLD AUTO: 0.1 K/UL (ref 0–0.04)
IMM GRANULOCYTES NFR BLD AUTO: 1 % (ref 0–0.5)
LACTATE SERPL-SCNC: 1.7 MMOL/L (ref 0.4–2)
LIPASE SERPL-CCNC: 22 U/L (ref 13–75)
LYMPHOCYTES # BLD: 1.4 K/UL (ref 0.8–3.5)
LYMPHOCYTES NFR BLD: 7 % (ref 12–49)
Lab: NORMAL
MCH RBC QN AUTO: 26.7 PG (ref 26–34)
MCHC RBC AUTO-ENTMCNC: 32.9 G/DL (ref 30–36.5)
MCV RBC AUTO: 81.3 FL (ref 80–99)
MDMA URINE: NEGATIVE
METHADONE UR QL: NEGATIVE
MONOCYTES # BLD: 0.5 K/UL (ref 0–1)
MONOCYTES NFR BLD: 3 % (ref 5–13)
NEUTS SEG # BLD: 17 K/UL (ref 1.8–8)
NEUTS SEG NFR BLD: 88 % (ref 32–75)
NRBC # BLD: 0 K/UL (ref 0–0.01)
NRBC BLD-RTO: 0 PER 100 WBC
OPIATES UR QL: NEGATIVE
PCP UR QL: NEGATIVE
PERFORMED BY:: ABNORMAL
PHOSPHATE SERPL-MCNC: 3.3 MG/DL (ref 2.6–4.7)
PLATELET # BLD AUTO: 254 K/UL (ref 150–400)
PMV BLD AUTO: 10.8 FL (ref 8.9–12.9)
POTASSIUM SERPL-SCNC: 4.2 MMOL/L (ref 3.5–5.1)
PROCALCITONIN SERPL-MCNC: 20.77 NG/ML
PROT SERPL-MCNC: 6.7 G/DL (ref 6.4–8.2)
RBC # BLD AUTO: 3.74 M/UL (ref 4.1–5.7)
SODIUM SERPL-SCNC: 136 MMOL/L (ref 136–145)
WBC # BLD AUTO: 19.2 K/UL (ref 4.1–11.1)

## 2023-11-14 PROCEDURE — 84145 PROCALCITONIN (PCT): CPT

## 2023-11-14 PROCEDURE — 1100000000 HC RM PRIVATE

## 2023-11-14 PROCEDURE — 6370000000 HC RX 637 (ALT 250 FOR IP): Performed by: HOSPITALIST

## 2023-11-14 PROCEDURE — 2580000003 HC RX 258: Performed by: HOSPITALIST

## 2023-11-14 PROCEDURE — 83605 ASSAY OF LACTIC ACID: CPT

## 2023-11-14 PROCEDURE — 83690 ASSAY OF LIPASE: CPT

## 2023-11-14 PROCEDURE — 36415 COLL VENOUS BLD VENIPUNCTURE: CPT

## 2023-11-14 PROCEDURE — 80053 COMPREHEN METABOLIC PANEL: CPT

## 2023-11-14 PROCEDURE — 83880 ASSAY OF NATRIURETIC PEPTIDE: CPT

## 2023-11-14 PROCEDURE — 85025 COMPLETE CBC W/AUTO DIFF WBC: CPT

## 2023-11-14 PROCEDURE — 97530 THERAPEUTIC ACTIVITIES: CPT

## 2023-11-14 PROCEDURE — 82962 GLUCOSE BLOOD TEST: CPT

## 2023-11-14 PROCEDURE — P9047 ALBUMIN (HUMAN), 25%, 50ML: HCPCS | Performed by: HOSPITALIST

## 2023-11-14 PROCEDURE — 84100 ASSAY OF PHOSPHORUS: CPT

## 2023-11-14 PROCEDURE — 86140 C-REACTIVE PROTEIN: CPT

## 2023-11-14 PROCEDURE — 76705 ECHO EXAM OF ABDOMEN: CPT

## 2023-11-14 PROCEDURE — 6360000002 HC RX W HCPCS: Performed by: HOSPITALIST

## 2023-11-14 PROCEDURE — 82077 ASSAY SPEC XCP UR&BREATH IA: CPT

## 2023-11-14 PROCEDURE — 94640 AIRWAY INHALATION TREATMENT: CPT

## 2023-11-14 RX ORDER — ALBUMIN (HUMAN) 12.5 G/50ML
25 SOLUTION INTRAVENOUS ONCE
Status: COMPLETED | OUTPATIENT
Start: 2023-11-14 | End: 2023-11-14

## 2023-11-14 RX ORDER — MIDODRINE HYDROCHLORIDE 5 MG/1
5 TABLET ORAL
Status: DISCONTINUED | OUTPATIENT
Start: 2023-11-14 | End: 2023-11-14

## 2023-11-14 RX ORDER — MIDODRINE HYDROCHLORIDE 5 MG/1
10 TABLET ORAL
Status: DISCONTINUED | OUTPATIENT
Start: 2023-11-15 | End: 2023-11-17 | Stop reason: HOSPADM

## 2023-11-14 RX ORDER — FUROSEMIDE 10 MG/ML
20 INJECTION INTRAMUSCULAR; INTRAVENOUS DAILY
Status: DISCONTINUED | OUTPATIENT
Start: 2023-11-14 | End: 2023-11-16

## 2023-11-14 RX ORDER — VANCOMYCIN 2 G/400ML
2000 INJECTION, SOLUTION INTRAVENOUS DAILY
Status: DISCONTINUED | OUTPATIENT
Start: 2023-11-14 | End: 2023-11-17

## 2023-11-14 RX ORDER — 0.9 % SODIUM CHLORIDE 0.9 %
250 INTRAVENOUS SOLUTION INTRAVENOUS ONCE
Status: COMPLETED | OUTPATIENT
Start: 2023-11-14 | End: 2023-11-14

## 2023-11-14 RX ORDER — FERROUS SULFATE 325(65) MG
325 TABLET ORAL 2 TIMES DAILY WITH MEALS
Status: DISCONTINUED | OUTPATIENT
Start: 2023-11-14 | End: 2023-11-17 | Stop reason: HOSPADM

## 2023-11-14 RX ADMIN — Medication 2 PUFF: at 08:37

## 2023-11-14 RX ADMIN — SODIUM CHLORIDE, PRESERVATIVE FREE 10 ML: 5 INJECTION INTRAVENOUS at 09:23

## 2023-11-14 RX ADMIN — ENOXAPARIN SODIUM 30 MG: 100 INJECTION SUBCUTANEOUS at 09:26

## 2023-11-14 RX ADMIN — ENOXAPARIN SODIUM 30 MG: 100 INJECTION SUBCUTANEOUS at 21:49

## 2023-11-14 RX ADMIN — MICONAZOLE NITRATE: 20 POWDER TOPICAL at 21:51

## 2023-11-14 RX ADMIN — SENNOSIDES 8.6 MG: 8.6 TABLET, FILM COATED ORAL at 21:49

## 2023-11-14 RX ADMIN — SODIUM CHLORIDE, PRESERVATIVE FREE 10 ML: 5 INJECTION INTRAVENOUS at 08:32

## 2023-11-14 RX ADMIN — MIDODRINE HYDROCHLORIDE 5 MG: 5 TABLET ORAL at 16:23

## 2023-11-14 RX ADMIN — Medication: at 16:17

## 2023-11-14 RX ADMIN — PIPERACILLIN AND TAZOBACTAM 3375 MG: 3; .375 INJECTION, POWDER, FOR SOLUTION INTRAVENOUS at 21:54

## 2023-11-14 RX ADMIN — VANCOMYCIN 2000 MG: 2 INJECTION, SOLUTION INTRAVENOUS at 09:19

## 2023-11-14 RX ADMIN — ALBUMIN (HUMAN) 25 G: 0.25 INJECTION, SOLUTION INTRAVENOUS at 11:36

## 2023-11-14 RX ADMIN — MIDODRINE HYDROCHLORIDE 5 MG: 5 TABLET ORAL at 08:33

## 2023-11-14 RX ADMIN — ASPIRIN 81 MG: 81 TABLET, COATED ORAL at 09:13

## 2023-11-14 RX ADMIN — FERROUS SULFATE TAB 325 MG (65 MG ELEMENTAL FE) 325 MG: 325 (65 FE) TAB at 16:23

## 2023-11-14 RX ADMIN — MIDODRINE HYDROCHLORIDE 5 MG: 5 TABLET ORAL at 13:01

## 2023-11-14 RX ADMIN — PIPERACILLIN AND TAZOBACTAM 3375 MG: 3; .375 INJECTION, POWDER, FOR SOLUTION INTRAVENOUS at 14:29

## 2023-11-14 RX ADMIN — PIPERACILLIN AND TAZOBACTAM 4500 MG: 4; .5 INJECTION, POWDER, LYOPHILIZED, FOR SOLUTION INTRAVENOUS at 08:31

## 2023-11-14 RX ADMIN — SODIUM CHLORIDE, PRESERVATIVE FREE 10 ML: 5 INJECTION INTRAVENOUS at 21:49

## 2023-11-14 RX ADMIN — Medication 400 MG: at 09:14

## 2023-11-14 RX ADMIN — FUROSEMIDE 20 MG: 10 INJECTION, SOLUTION INTRAMUSCULAR; INTRAVENOUS at 11:36

## 2023-11-14 RX ADMIN — MICONAZOLE NITRATE: 20 POWDER TOPICAL at 09:27

## 2023-11-14 RX ADMIN — ACETAMINOPHEN 650 MG: 325 TABLET ORAL at 03:43

## 2023-11-14 RX ADMIN — DOCUSATE SODIUM 100 MG: 100 CAPSULE, LIQUID FILLED ORAL at 21:49

## 2023-11-14 RX ADMIN — SODIUM CHLORIDE, PRESERVATIVE FREE 10 ML: 5 INJECTION INTRAVENOUS at 11:38

## 2023-11-14 RX ADMIN — SODIUM CHLORIDE 250 ML: 9 INJECTION, SOLUTION INTRAVENOUS at 08:31

## 2023-11-14 RX ADMIN — DOCUSATE SODIUM 100 MG: 100 CAPSULE, LIQUID FILLED ORAL at 09:13

## 2023-11-14 RX ADMIN — Medication 2 PUFF: at 19:38

## 2023-11-14 RX ADMIN — Medication: at 09:27

## 2023-11-14 ASSESSMENT — PAIN SCALES - GENERAL
PAINLEVEL_OUTOF10: 0
PAINLEVEL_OUTOF10: 3
PAINLEVEL_OUTOF10: 10
PAINLEVEL_OUTOF10: 5
PAINLEVEL_OUTOF10: 2
PAINLEVEL_OUTOF10: 10
PAINLEVEL_OUTOF10: 9

## 2023-11-14 ASSESSMENT — ENCOUNTER SYMPTOMS
ABDOMINAL DISTENTION: 1
RESPIRATORY NEGATIVE: 1
ALLERGIC/IMMUNOLOGIC NEGATIVE: 1
EYES NEGATIVE: 1

## 2023-11-14 ASSESSMENT — PAIN DESCRIPTION - ORIENTATION: ORIENTATION: LEFT

## 2023-11-14 ASSESSMENT — PAIN SCALES - WONG BAKER: WONGBAKER_NUMERICALRESPONSE: 0

## 2023-11-14 ASSESSMENT — PAIN DESCRIPTION - DESCRIPTORS
DESCRIPTORS: ACHING
DESCRIPTORS: OTHER (COMMENT)

## 2023-11-14 ASSESSMENT — PAIN DESCRIPTION - LOCATION
LOCATION: HIP
LOCATION: GENERALIZED

## 2023-11-14 NOTE — PLAN OF CARE
PHYSICAL THERAPY TREATMENT    Patient: Aleyda Dorsey (2 y.o. male)  Date: 11/14/2023  Diagnosis: Adult failure to thrive [R62.7]  Peripheral edema [R60.9]  Generalized weakness [R53.1]  Cellulitis of lower extremity, unspecified laterality [L03.119]  Heart failure (HCC) [I50.9] Heart failure ([address])      Precautions: Fall Risk                    ASSESSMENT:    Patient continues to benefit from skilled PT services and is slowly progressing towards goals. Pt performed sit <> supine max A of 2. Pt able to sit slightly to EOB with max A of 2 - pt unable to sit unsupported. Pt c/o increased back pain throughout session. PLAN:  Patient continues to benefit from skilled intervention to address the above impairments. Continue treatment per established plan of care. Recommendation for discharge: (in order for the patient to meet his/her long term goals): Continue to assess pending progress    Other factors to consider for discharge: high risk for falls    IF patient discharges home will need the following DME:  TBD       SUBJECTIVE:   Patient stated, \"You're welcome anytime. \"    OBJECTIVE DATA SUMMARY:   Critical Behavior:          Functional Mobility Training:  Bed Mobility:  Bed Mobility Training  Bed Mobility Training: Yes  Overall Level of Assistance: Maximum assistance;Assist X2  Interventions: Demonstration;Visual cues;Manual cues  Rolling: Maximum assistance  Supine to Sit: Maximum assistance;Assist X2  Sit to Supine: Maximum assistance;Assist X2  Scooting: Total assistance  Transfers:     Balance:  Balance  Sitting: Impaired  Sitting - Static: Poor (constant support)  Treatment Session:  Pt supine in bed upon entry to room. Pt agreeable to therapy. Pt laying crooked in bed stating his back was hurting - already had Tylenol. With each attempt of movement, pt c/o increased back pain. Pt required max A of 2 for supine to sit - pt unable to get completely to EOB.  Pt able to only tolerate a few minutes of sitting before asking to lay back down. Pt required max A of 2 for sit to supine. Bed assist to slide pt to [facility]. Positioned pt to comfortable position with legs elevated. All needs met and call bell within reach. Pain Rating:  10/10   Pain Intervention(s):   patient medicated for pain prior to session    Activity Tolerance:   Poor    After treatment:   Patient left in no apparent distress in bed, Call bell within reach, and Heels elevated for pressure relief      COMMUNICATION/EDUCATION:   The patient's plan of care was discussed with: physical therapist    Patient Education  Education Given To: Patient  Education Provided: Transfer Training      Nola Sotelo PTA, LPTA  Minutes: 18   Problem: Physical Therapy - Adult  Goal: By Discharge: Performs mobility at highest level of function for planned discharge setting. See evaluation for individualized goals. Description: FUNCTIONAL STATUS PRIOR TO ADMISSION: Patient was modified independent using a rollator for functional mobility. HOME SUPPORT PRIOR TO ADMISSION: The patient lived alone with home aid to provide assistance with homemaking tasks. Physical Therapy Goals  Initiated 11/13/2023  1. Patient will roll side to side in bed with minimal assistance within 7 day(s). 2.  Patient will perform supine to sitting with moderate assistance within 7 day(s). 3.  Patient will perform sit to stand with moderate assistance using the least restrictive device within 7 day(s). 4.  Patient will transfer to and from chair with maximum assistance with the least restrictive device within 7 day(s).    Outcome: Progressing

## 2023-11-14 NOTE — PROGRESS NOTES
Hospitalist Progress Note          Dr. Danae Bai MD      Date:11/14/2023       Room:[facility]  Patient Mikael Trinidad     YOB: 1935     Age:88 y.o. Chief Complaint   Patient presents with    Generalized Body Aches    Extremity Weakness         HPI as per admitting provider on 11/13/2023  Jenifer Gillis is a 81 y.o. male followed by Sara Frank MD and  has a past medical history of Hypercholesteremia, Presence of cardiac pacemaker, Type 2 diabetes mellitus without complication, with long-term current use of insulin ([address]), and Venous stasis dermatitis of both lower extremities. Mostly history was obtained from the patient but at times not the best historian so reviewed notes received from the [address] [place] [address] from primary care as well as cardiology. Patient states that since Friday he has been unable to move out of the chair. There is question of a slip and fall on 11/10 patient was seen emergency room. Since he was having some was noted pain which he says usually occurs when he is or extremities get swollen and is noted to have 3-4+ edema with some vascular stasis noted. He has chronic back issues. Says he has constant pain. 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 At that time had a blood pressure 117/70 and had similar blood pressure arrival in the emergency room but became low on repeat checks on acute care and so to a 250 cc bolus was given. Patient chest x-ray is negative for any acute pathology and BNP was only minimally elevated.   Patient is noted to be diabetic and A1c recently checked was at 6.8 BUN/creatinine on the same screen for drugs of abuse in a medical setting only (i.e., they are unconfirmed results and as such must not be used for non-medical purposes, e.g.,employment testing, legal testing). Due to its inherent nature, false positive (FP) and false negative (FN) results may be obtained. Therefore, if necessary for medical care, recommend confirmation of positive findings by GC/MS.      CBC with Auto Differential    Collection Time: 11/13/23  4:56 AM   Result Value Ref Range    WBC 8.9 4.1 - 11.1 K/uL    RBC 4.47 4.10 - 5.70 M/uL    Hemoglobin 11.8 (L) 12.1 - 17.0 g/dL    Hematocrit 37.0 36.6 - 50.3 %    MCV 82.8 80.0 - 99.0 FL    MCH 26.4 26.0 - 34.0 PG    MCHC 31.9 30.0 - 36.5 g/dL    RDW 16.4 (H) 11.5 - 14.5 %    Platelets 915 037 - 611 K/uL    MPV 10.4 8.9 - 12.9 FL    Nucleated RBCs 0.0 0.0  WBC    nRBC 0.00 0.00 - 0.01 K/uL    Neutrophils % 73 32 - 75 %    Lymphocytes % 18 12 - 49 %    Monocytes % 5 5 - 13 %    Eosinophils % 1 0 - 7 %    Basophils % 1 0 - 1 %    Immature Granulocytes 2 (H) 0 - 0.5 %    Neutrophils Absolute 6.5 1.8 - 8.0 K/UL    Lymphocytes Absolute 1.6 0.8 - 3.5 K/UL    Monocytes Absolute 0.5 0.0 - 1.0 K/UL    Eosinophils Absolute 0.1 0.0 - 0.4 K/UL    Basophils Absolute 0.1 0.0 - 0.1 K/UL    Absolute Immature Granulocyte 0.2 (H) 0.00 - 0.04 K/UL    Differential Type AUTOMATED     CMP    Collection Time: 11/13/23  4:56 AM   Result Value Ref Range    Sodium 135 (L) 136 - 145 mmol/L    Potassium 3.8 3.5 - 5.1 mmol/L    Chloride 98 97 - 108 mmol/L    CO2 28 21 - 32 mmol/L    Anion Gap 9 5 - 15 mmol/L    Glucose 133 (H) 65 - 100 mg/dL    BUN 23 (H) 6 - 20 mg/dL    Creatinine 1.39 (H) 0.70 - 1.30 mg/dL    Bun/Cre Ratio 17 12 - 20      Est, Glom Filt Rate 49 (L) >60 ml/min/1.73m2    Calcium 9.3 8.5 - 10.1 mg/dL    Total Bilirubin 1.1 (H) 0.2 - 1.0 mg/dL    AST 52 (H) 15 - 37 U/L    ALT 43 12 - 78 U/L    Alk Phosphatase 133 (H) 45 - 117 U/L    Total Protein 8.3 (H) 6.4 - 8.2 g/dL    Albumin 2.7 (L) 3.5

## 2023-11-14 NOTE — PROGRESS NOTES
Lying in bed awake. C/o back pain on movement. Redness and edema noted to BLE. External catheter in use.

## 2023-11-14 NOTE — PLAN OF CARE
Problem: Skin/Tissue Integrity  Goal: Absence of new skin breakdown  Description: 1. Monitor for areas of redness and/or skin breakdown  2. Assess vascular access sites hourly  3. Every 4-6 hours minimum:  Change oxygen saturation probe site  4. Every 4-6 hours:  If on nasal continuous positive airway pressure, respiratory therapy assess nares and determine need for appliance change or resting period.   Outcome: Progressing  Note: Apply treatments as ordered for rash  Keep skin as dry as possible  Protective dressings as needed  Reposition patient frequently

## 2023-11-14 NOTE — PROGRESS NOTES
Bedside shift change report given to Tootie Dawson LPN (oncoming nurse) by Berenice Jarrell (offgoing nurse). Report included the following information Nurse Handoff Report.

## 2023-11-14 NOTE — CARE COORDINATION
Per [facility] in [address], patient is not service connected. No other insurance on file to cover SNF. Sent email to patient access to check benefits for patient. Will continue to follow to determine DC plan.

## 2023-11-14 NOTE — PROGRESS NOTES
Patient c/o of pain in the lower back. Rates 10/10. Administer Tylenol. Patient states when he is at home he takes Aleve every morning for the pain.

## 2023-11-14 NOTE — PROGRESS NOTES
ST screen completed for pocketing of food reported by OT. Patient with multiple missing dentition and reports that he has a hard time chewing food. Patient reports that at home he eats \"whatever he wants\" but it is cooked soft. ST educated patient on soft and bite sized diet and patient agreed that would help decrease chewing effort. ST consulted with MD and orders were entered. Please consult if further intervention is needed.  Thank you,    Nora Lorenzo, [facility], [address] [facility]

## 2023-11-15 ENCOUNTER — APPOINTMENT (OUTPATIENT)
Facility: HOSPITAL | Age: 88
DRG: 871 | End: 2023-11-15
Attending: HOSPITALIST
Payer: MEDICARE

## 2023-11-15 LAB
ALBUMIN SERPL-MCNC: 2 G/DL (ref 3.5–5)
ALBUMIN/GLOB SERPL: 0.4 (ref 1.1–2.2)
ALP SERPL-CCNC: 159 U/L (ref 45–117)
ALT SERPL-CCNC: 29 U/L (ref 12–78)
ANION GAP SERPL CALC-SCNC: 5 MMOL/L (ref 5–15)
ANION GAP SERPL CALC-SCNC: 7 MMOL/L (ref 5–15)
AST SERPL W P-5'-P-CCNC: 53 U/L (ref 15–37)
BASOPHILS # BLD: 0.1 K/UL (ref 0–0.1)
BASOPHILS NFR BLD: 0 % (ref 0–1)
BILIRUB SERPL-MCNC: 1.1 MG/DL (ref 0.2–1)
BUN SERPL-MCNC: 28 MG/DL (ref 6–20)
BUN SERPL-MCNC: 30 MG/DL (ref 6–20)
BUN/CREAT SERPL: 22 (ref 12–20)
BUN/CREAT SERPL: 24 (ref 12–20)
CA-I BLD-MCNC: 8.7 MG/DL (ref 8.5–10.1)
CA-I BLD-MCNC: 8.7 MG/DL (ref 8.5–10.1)
CHLORIDE SERPL-SCNC: 100 MMOL/L (ref 97–108)
CHLORIDE SERPL-SCNC: 102 MMOL/L (ref 97–108)
CO2 SERPL-SCNC: 27 MMOL/L (ref 21–32)
CO2 SERPL-SCNC: 29 MMOL/L (ref 21–32)
CREAT SERPL-MCNC: 1.25 MG/DL (ref 0.7–1.3)
CREAT SERPL-MCNC: 1.29 MG/DL (ref 0.7–1.3)
CRP SERPL-MCNC: 35.08 MG/DL (ref 0–0.6)
DATE LAST DOSE: NORMAL
DIFFERENTIAL METHOD BLD: ABNORMAL
DOSE AMOUNT: NORMAL UNITS
EOSINOPHIL # BLD: 0.1 K/UL (ref 0–0.4)
EOSINOPHIL NFR BLD: 1 % (ref 0–7)
ERYTHROCYTE [DISTWIDTH] IN BLOOD BY AUTOMATED COUNT: 16.7 % (ref 11.5–14.5)
GLOBULIN SER CALC-MCNC: 5.5 G/DL (ref 2–4)
GLUCOSE BLD STRIP.AUTO-MCNC: 105 MG/DL (ref 65–100)
GLUCOSE BLD STRIP.AUTO-MCNC: 109 MG/DL (ref 65–100)
GLUCOSE BLD STRIP.AUTO-MCNC: 114 MG/DL (ref 65–100)
GLUCOSE BLD STRIP.AUTO-MCNC: 129 MG/DL (ref 65–100)
GLUCOSE SERPL-MCNC: 140 MG/DL (ref 65–100)
GLUCOSE SERPL-MCNC: 96 MG/DL (ref 65–100)
HCT VFR BLD AUTO: 31.2 % (ref 36.6–50.3)
HGB BLD-MCNC: 10.3 G/DL (ref 12.1–17)
IMM GRANULOCYTES # BLD AUTO: 0.2 K/UL (ref 0–0.04)
IMM GRANULOCYTES NFR BLD AUTO: 1 % (ref 0–0.5)
LYMPHOCYTES # BLD: 1.8 K/UL (ref 0.8–3.5)
LYMPHOCYTES NFR BLD: 8 % (ref 12–49)
MCH RBC QN AUTO: 26.5 PG (ref 26–34)
MCHC RBC AUTO-ENTMCNC: 33 G/DL (ref 30–36.5)
MCV RBC AUTO: 80.2 FL (ref 80–99)
MONOCYTES # BLD: 1 K/UL (ref 0–1)
MONOCYTES NFR BLD: 5 % (ref 5–13)
NEUTS SEG # BLD: 18.7 K/UL (ref 1.8–8)
NEUTS SEG NFR BLD: 85 % (ref 32–75)
NRBC # BLD: 0 K/UL (ref 0–0.01)
NRBC BLD-RTO: 0 PER 100 WBC
PERFORMED BY:: ABNORMAL
PLATELET # BLD AUTO: 280 K/UL (ref 150–400)
PMV BLD AUTO: 10.9 FL (ref 8.9–12.9)
POTASSIUM SERPL-SCNC: 3.7 MMOL/L (ref 3.5–5.1)
POTASSIUM SERPL-SCNC: 4 MMOL/L (ref 3.5–5.1)
PROCALCITONIN SERPL-MCNC: 8.76 NG/ML
PROT SERPL-MCNC: 7.5 G/DL (ref 6.4–8.2)
RBC # BLD AUTO: 3.89 M/UL (ref 4.1–5.7)
SODIUM SERPL-SCNC: 132 MMOL/L (ref 136–145)
SODIUM SERPL-SCNC: 138 MMOL/L (ref 136–145)
VANCOMYCIN SERPL-MCNC: 10.9 UG/ML
WBC # BLD AUTO: 21.9 K/UL (ref 4.1–11.1)

## 2023-11-15 PROCEDURE — 6370000000 HC RX 637 (ALT 250 FOR IP): Performed by: HOSPITALIST

## 2023-11-15 PROCEDURE — 1100000000 HC RM PRIVATE

## 2023-11-15 PROCEDURE — 80053 COMPREHEN METABOLIC PANEL: CPT

## 2023-11-15 PROCEDURE — 94640 AIRWAY INHALATION TREATMENT: CPT

## 2023-11-15 PROCEDURE — 6360000002 HC RX W HCPCS: Performed by: HOSPITALIST

## 2023-11-15 PROCEDURE — 2580000003 HC RX 258: Performed by: HOSPITALIST

## 2023-11-15 PROCEDURE — 82962 GLUCOSE BLOOD TEST: CPT

## 2023-11-15 PROCEDURE — 73706 CT ANGIO LWR EXTR W/O&W/DYE: CPT

## 2023-11-15 PROCEDURE — 6360000004 HC RX CONTRAST MEDICATION: Performed by: HOSPITALIST

## 2023-11-15 PROCEDURE — 80048 BASIC METABOLIC PNL TOTAL CA: CPT

## 2023-11-15 PROCEDURE — 84145 PROCALCITONIN (PCT): CPT

## 2023-11-15 PROCEDURE — 80202 ASSAY OF VANCOMYCIN: CPT

## 2023-11-15 PROCEDURE — 85025 COMPLETE CBC W/AUTO DIFF WBC: CPT

## 2023-11-15 PROCEDURE — 36415 COLL VENOUS BLD VENIPUNCTURE: CPT

## 2023-11-15 PROCEDURE — 86140 C-REACTIVE PROTEIN: CPT

## 2023-11-15 RX ADMIN — PIPERACILLIN AND TAZOBACTAM 3375 MG: 3; .375 INJECTION, POWDER, FOR SOLUTION INTRAVENOUS at 05:59

## 2023-11-15 RX ADMIN — ENOXAPARIN SODIUM 30 MG: 100 INJECTION SUBCUTANEOUS at 21:51

## 2023-11-15 RX ADMIN — FERROUS SULFATE TAB 325 MG (65 MG ELEMENTAL FE) 325 MG: 325 (65 FE) TAB at 10:41

## 2023-11-15 RX ADMIN — ASPIRIN 81 MG: 81 TABLET, COATED ORAL at 10:41

## 2023-11-15 RX ADMIN — IOPAMIDOL 100 ML: 755 INJECTION, SOLUTION INTRAVENOUS at 13:13

## 2023-11-15 RX ADMIN — MIDODRINE HYDROCHLORIDE 10 MG: 5 TABLET ORAL at 14:37

## 2023-11-15 RX ADMIN — FERROUS SULFATE TAB 325 MG (65 MG ELEMENTAL FE) 325 MG: 325 (65 FE) TAB at 17:00

## 2023-11-15 RX ADMIN — ENOXAPARIN SODIUM 30 MG: 100 INJECTION SUBCUTANEOUS at 10:39

## 2023-11-15 RX ADMIN — MICONAZOLE NITRATE: 20 POWDER TOPICAL at 21:54

## 2023-11-15 RX ADMIN — FUROSEMIDE 20 MG: 10 INJECTION, SOLUTION INTRAMUSCULAR; INTRAVENOUS at 10:40

## 2023-11-15 RX ADMIN — VANCOMYCIN 2000 MG: 2 INJECTION, SOLUTION INTRAVENOUS at 10:41

## 2023-11-15 RX ADMIN — Medication 400 MG: at 10:41

## 2023-11-15 RX ADMIN — MIDODRINE HYDROCHLORIDE 10 MG: 5 TABLET ORAL at 16:59

## 2023-11-15 RX ADMIN — SODIUM CHLORIDE, PRESERVATIVE FREE 10 ML: 5 INJECTION INTRAVENOUS at 10:41

## 2023-11-15 RX ADMIN — INSULIN LISPRO 4 UNITS: 100 INJECTION, SOLUTION INTRAVENOUS; SUBCUTANEOUS at 17:00

## 2023-11-15 RX ADMIN — MICONAZOLE NITRATE: 20 POWDER TOPICAL at 10:43

## 2023-11-15 RX ADMIN — PIPERACILLIN AND TAZOBACTAM 3375 MG: 3; .375 INJECTION, POWDER, FOR SOLUTION INTRAVENOUS at 14:37

## 2023-11-15 RX ADMIN — Medication: at 18:03

## 2023-11-15 RX ADMIN — DOCUSATE SODIUM 100 MG: 100 CAPSULE, LIQUID FILLED ORAL at 10:41

## 2023-11-15 RX ADMIN — Medication 2 PUFF: at 08:06

## 2023-11-15 RX ADMIN — PIPERACILLIN AND TAZOBACTAM 3375 MG: 3; .375 INJECTION, POWDER, FOR SOLUTION INTRAVENOUS at 21:54

## 2023-11-15 RX ADMIN — SENNOSIDES 8.6 MG: 8.6 TABLET, FILM COATED ORAL at 21:52

## 2023-11-15 RX ADMIN — DOCUSATE SODIUM 100 MG: 100 CAPSULE, LIQUID FILLED ORAL at 21:52

## 2023-11-15 RX ADMIN — Medication 2 PUFF: at 19:22

## 2023-11-15 RX ADMIN — MIDODRINE HYDROCHLORIDE 10 MG: 5 TABLET ORAL at 10:40

## 2023-11-15 RX ADMIN — INSULIN LISPRO 4 UNITS: 100 INJECTION, SOLUTION INTRAVENOUS; SUBCUTANEOUS at 10:39

## 2023-11-15 RX ADMIN — Medication: at 10:43

## 2023-11-15 RX ADMIN — SODIUM CHLORIDE, PRESERVATIVE FREE 10 ML: 5 INJECTION INTRAVENOUS at 21:55

## 2023-11-15 RX ADMIN — INSULIN LISPRO 4 UNITS: 100 INJECTION, SOLUTION INTRAVENOUS; SUBCUTANEOUS at 14:37

## 2023-11-15 ASSESSMENT — PAIN SCALES - GENERAL
PAINLEVEL_OUTOF10: 0

## 2023-11-15 ASSESSMENT — ENCOUNTER SYMPTOMS
EYES NEGATIVE: 1
RESPIRATORY NEGATIVE: 1
ALLERGIC/IMMUNOLOGIC NEGATIVE: 1
ABDOMINAL DISTENTION: 1

## 2023-11-15 NOTE — PROGRESS NOTES
Spiritual Care Assessment/Progress Note  [address]    Name: Sarah Corona MRN: [id number]    Age: 81 y.o. Sex: male   Language: English     Date: 11/15/2023            Total Time Calculated: 15 min              Spiritual Assessment begun in [facility]  Service Provided For[de-identified] Patient and family together  Referral/Consult From[de-identified] Rounding  Encounter Overview/Reason : Initial Encounter    Spiritual beliefs:      [x] Involved in a cristina tradition/spiritual practice: Bryanna Ortiz     [] Supported by a cristina community:      [] Claims no spiritual orientation:      [] Seeking spiritual identity:           [] Adheres to an individual form of spirituality:      [] Not able to assess:                Identified resources for coping and support system:   Support System: Family members       [] Prayer                  [] Devotional reading               [] Music                  [] Guided Imagery     [] Pet visits                                        [] Other: (COMMENT)     Specific area/focus of visit   Encounter:    Crisis:    Spiritual/Emotional needs:    Ritual, Rites and Sacraments:    Grief, Loss, and Adjustments: Type: Adjustment to illness, Life Adjustments  Ethics/Mediation:    Behavioral Health:    Palliative Care: Advance Care Planning:      Plan/Referrals: No future visits requested    Narrative: Patient seen by Stephanie Howell, [address].  Intern for Initial Spiritual Health Assessment. Patient is resting in bed with nephew present. Patient states he is somewhat better than when he was first admitted to the hospital. However, he responds he is still in quite a bit of generalized pain. Patient shared life/review/legacy including Gn[employer] and work history. States he is originally from [place], [place] and moved [place] to assist his sister. States he enjoys being in Buddhist at his Gnosticist and he's thankful for his nephew and friends that helps him get through.  Listened

## 2023-11-15 NOTE — CARE COORDINATION
11/14/23 1059   Service Assessment   Patient Orientation Alert and Oriented   Cognition Alert   History Provided By Physician;Medical Record   Primary [address]  Staff; Family Members   Patient's [address] is: Legal Next of [facility]   PCP Verified by CM Yes   Prior Functional Level Independent in ADLs/IADLs   Current Functional Level Assistance with the following:   Can patient return to prior living arrangement Other (see comment)  (following PT/OT rec)   Ability to make needs known: Good   Family able to assist with home care needs: No   Would you like for me to discuss the discharge plan with any other family members/significant others, and if so, who? No   Financial Resources  ([place]); Financial Counseling; Other (Comment)  (not service connected)   Community Resources None   CM/SW Referral ADLs/IADLs; Difficulty coping/adjusting to illness; Financial   Social/Functional History   Lives With Alone   Type of [facility]  One level   Home Access Ramped entrance   [place] care attendant; Family   ADL Assistance Needs assistance   Toileting Needs assistance   Homemaking Assistance Needs assistance   Ambulation Assistance Needs assistance   Transfer Assistance Needs assistance   Active  No   Discharge Planning   Type of Residence House   Living Arrangements Alone   Current Services Prior To Admission Home Care   Potential Assistance Needed Skilled Nursing Facility;Home Care   DME Ordered? No   Potential Assistance Purchasing Medications No   Type of [address]   Patient expects to be discharged to: [facility] Discharge   Transition of Care Consult (CM Consult) SNF; Discharge Planning   Partner SNF No   Reason Why Partner SNF Not Chosen Location   Services At/After Discharge [facility] (SNF)   [address] Provided?  Refused

## 2023-11-15 NOTE — CONSULTS
Nutrition Education    Educated on Heart Healthy Diet including weight loss   Learners: Patient  Readiness: Acceptance  Method: Explanation, Handout, and Teachback  Response: Verbalizes Understanding  Contact name and number provided. Pt reports recently adjusting diet to include smaller portions to help him lose weight. Concern over pt cutting out entire food groups (ex. Just eating mashed potatoes), RD educated on consuming balanced meals while decrease portions. RD further educated on low sodium, high fiber diet and swapping out saturated fats for poly- and monounsaturated fats. Pt appears to heave a fair background knowledge on heart healthy diet and was agreeable to making changes. No Qs at end of edu.       KRYSTIAN Reyes  Contact Number: Ext [phone], or via [employer]

## 2023-11-15 NOTE — PROGRESS NOTES
Attempted PT treatment today, however pt was being taken for CT scan. PT assisted Radiology staff with transport and positioning in the scanner. Will re-attempt at next earliest convenience.

## 2023-11-15 NOTE — PROGRESS NOTES
Attempt to see patient for OT visit this AM with patient being taken down for scan. Will attempt OT visit at later time.

## 2023-11-15 NOTE — DISCHARGE INSTR - DIET
We recommend creating an outpatient appointment for Nutrition Counseling to better help manage your heart failure and weight management.    To make an appointment, please call [phone]

## 2023-11-15 NOTE — PROGRESS NOTES
Patient resistive to asking for assistance, wants to do moving himself, but fails to completely move as needed. Legs continue to weep, Leaking primofit,patient encouraged to accept assistance.

## 2023-11-16 LAB
ALBUMIN SERPL-MCNC: 2 G/DL (ref 3.5–5)
ALBUMIN/GLOB SERPL: 0.4 (ref 1.1–2.2)
ALP SERPL-CCNC: 159 U/L (ref 45–117)
ALT SERPL-CCNC: 28 U/L (ref 12–78)
ANION GAP SERPL CALC-SCNC: 5 MMOL/L (ref 5–15)
AST SERPL W P-5'-P-CCNC: 53 U/L (ref 15–37)
BASOPHILS # BLD: 0.1 K/UL (ref 0–0.1)
BASOPHILS NFR BLD: 1 % (ref 0–1)
BILIRUB SERPL-MCNC: 1.3 MG/DL (ref 0.2–1)
BUN SERPL-MCNC: 29 MG/DL (ref 6–20)
BUN/CREAT SERPL: 24 (ref 12–20)
CA-I BLD-MCNC: 8.7 MG/DL (ref 8.5–10.1)
CHLORIDE SERPL-SCNC: 101 MMOL/L (ref 97–108)
CO2 SERPL-SCNC: 30 MMOL/L (ref 21–32)
CREAT SERPL-MCNC: 1.21 MG/DL (ref 0.7–1.3)
CRP SERPL-MCNC: 30.8 MG/DL (ref 0–0.6)
DIFFERENTIAL METHOD BLD: ABNORMAL
EOSINOPHIL # BLD: 0.1 K/UL (ref 0–0.4)
EOSINOPHIL NFR BLD: 1 % (ref 0–7)
ERYTHROCYTE [DISTWIDTH] IN BLOOD BY AUTOMATED COUNT: 16.5 % (ref 11.5–14.5)
GLOBULIN SER CALC-MCNC: 5.4 G/DL (ref 2–4)
GLUCOSE BLD STRIP.AUTO-MCNC: 108 MG/DL (ref 65–100)
GLUCOSE BLD STRIP.AUTO-MCNC: 126 MG/DL (ref 65–100)
GLUCOSE BLD STRIP.AUTO-MCNC: 134 MG/DL (ref 65–100)
GLUCOSE BLD STRIP.AUTO-MCNC: 97 MG/DL (ref 65–100)
GLUCOSE SERPL-MCNC: 107 MG/DL (ref 65–100)
HCT VFR BLD AUTO: 32.8 % (ref 36.6–50.3)
HGB BLD-MCNC: 10.9 G/DL (ref 12.1–17)
IMM GRANULOCYTES # BLD AUTO: 0.2 K/UL (ref 0–0.04)
IMM GRANULOCYTES NFR BLD AUTO: 2 % (ref 0–0.5)
LYMPHOCYTES # BLD: 1.9 K/UL (ref 0.8–3.5)
LYMPHOCYTES NFR BLD: 15 % (ref 12–49)
MAGNESIUM SERPL-MCNC: 2.1 MG/DL (ref 1.6–2.4)
MCH RBC QN AUTO: 26.1 PG (ref 26–34)
MCHC RBC AUTO-ENTMCNC: 33.2 G/DL (ref 30–36.5)
MCV RBC AUTO: 78.7 FL (ref 80–99)
MONOCYTES # BLD: 1 K/UL (ref 0–1)
MONOCYTES NFR BLD: 8 % (ref 5–13)
NEUTS SEG # BLD: 9.2 K/UL (ref 1.8–8)
NEUTS SEG NFR BLD: 73 % (ref 32–75)
NRBC # BLD: 0 K/UL (ref 0–0.01)
NRBC BLD-RTO: 0 PER 100 WBC
PERFORMED BY:: ABNORMAL
PERFORMED BY:: NORMAL
PLATELET # BLD AUTO: 320 K/UL (ref 150–400)
PMV BLD AUTO: 10.6 FL (ref 8.9–12.9)
POTASSIUM SERPL-SCNC: 3.7 MMOL/L (ref 3.5–5.1)
PROCALCITONIN SERPL-MCNC: 6.02 NG/ML
PROT SERPL-MCNC: 7.4 G/DL (ref 6.4–8.2)
RBC # BLD AUTO: 4.17 M/UL (ref 4.1–5.7)
SODIUM SERPL-SCNC: 136 MMOL/L (ref 136–145)
WBC # BLD AUTO: 12.5 K/UL (ref 4.1–11.1)

## 2023-11-16 PROCEDURE — 6370000000 HC RX 637 (ALT 250 FOR IP): Performed by: HOSPITALIST

## 2023-11-16 PROCEDURE — 36415 COLL VENOUS BLD VENIPUNCTURE: CPT

## 2023-11-16 PROCEDURE — 97530 THERAPEUTIC ACTIVITIES: CPT

## 2023-11-16 PROCEDURE — 87040 BLOOD CULTURE FOR BACTERIA: CPT

## 2023-11-16 PROCEDURE — 86140 C-REACTIVE PROTEIN: CPT

## 2023-11-16 PROCEDURE — 6360000002 HC RX W HCPCS: Performed by: HOSPITALIST

## 2023-11-16 PROCEDURE — 2580000003 HC RX 258: Performed by: HOSPITALIST

## 2023-11-16 PROCEDURE — 85025 COMPLETE CBC W/AUTO DIFF WBC: CPT

## 2023-11-16 PROCEDURE — 1100000000 HC RM PRIVATE

## 2023-11-16 PROCEDURE — 83735 ASSAY OF MAGNESIUM: CPT

## 2023-11-16 PROCEDURE — 94640 AIRWAY INHALATION TREATMENT: CPT

## 2023-11-16 PROCEDURE — 84145 PROCALCITONIN (PCT): CPT

## 2023-11-16 PROCEDURE — 6360000002 HC RX W HCPCS: Performed by: NURSE PRACTITIONER

## 2023-11-16 PROCEDURE — 82962 GLUCOSE BLOOD TEST: CPT

## 2023-11-16 PROCEDURE — 80053 COMPREHEN METABOLIC PANEL: CPT

## 2023-11-16 RX ORDER — FUROSEMIDE 10 MG/ML
20 INJECTION INTRAMUSCULAR; INTRAVENOUS 2 TIMES DAILY
Status: DISCONTINUED | OUTPATIENT
Start: 2023-11-16 | End: 2023-11-17

## 2023-11-16 RX ORDER — FUROSEMIDE 10 MG/ML
20 INJECTION INTRAMUSCULAR; INTRAVENOUS ONCE
Status: COMPLETED | OUTPATIENT
Start: 2023-11-16 | End: 2023-11-16

## 2023-11-16 RX ADMIN — PIPERACILLIN AND TAZOBACTAM 3375 MG: 3; .375 INJECTION, POWDER, FOR SOLUTION INTRAVENOUS at 14:36

## 2023-11-16 RX ADMIN — INSULIN LISPRO 4 UNITS: 100 INJECTION, SOLUTION INTRAVENOUS; SUBCUTANEOUS at 14:20

## 2023-11-16 RX ADMIN — MIDODRINE HYDROCHLORIDE 10 MG: 5 TABLET ORAL at 09:21

## 2023-11-16 RX ADMIN — Medication 2 PUFF: at 19:57

## 2023-11-16 RX ADMIN — ASPIRIN 81 MG: 81 TABLET, COATED ORAL at 09:21

## 2023-11-16 RX ADMIN — MIDODRINE HYDROCHLORIDE 10 MG: 5 TABLET ORAL at 17:30

## 2023-11-16 RX ADMIN — Medication 400 MG: at 09:21

## 2023-11-16 RX ADMIN — MICONAZOLE NITRATE: 20 POWDER TOPICAL at 22:12

## 2023-11-16 RX ADMIN — VANCOMYCIN 2000 MG: 2 INJECTION, SOLUTION INTRAVENOUS at 12:15

## 2023-11-16 RX ADMIN — DOCUSATE SODIUM 100 MG: 100 CAPSULE, LIQUID FILLED ORAL at 20:55

## 2023-11-16 RX ADMIN — DOCUSATE SODIUM 100 MG: 100 CAPSULE, LIQUID FILLED ORAL at 09:22

## 2023-11-16 RX ADMIN — MICONAZOLE NITRATE: 20 POWDER TOPICAL at 14:34

## 2023-11-16 RX ADMIN — PIPERACILLIN AND TAZOBACTAM 3375 MG: 3; .375 INJECTION, POWDER, FOR SOLUTION INTRAVENOUS at 22:12

## 2023-11-16 RX ADMIN — ENOXAPARIN SODIUM 30 MG: 100 INJECTION SUBCUTANEOUS at 20:55

## 2023-11-16 RX ADMIN — FUROSEMIDE 20 MG: 10 INJECTION, SOLUTION INTRAMUSCULAR; INTRAVENOUS at 14:22

## 2023-11-16 RX ADMIN — FERROUS SULFATE TAB 325 MG (65 MG ELEMENTAL FE) 325 MG: 325 (65 FE) TAB at 17:30

## 2023-11-16 RX ADMIN — SODIUM CHLORIDE, PRESERVATIVE FREE 10 ML: 5 INJECTION INTRAVENOUS at 22:10

## 2023-11-16 RX ADMIN — INSULIN LISPRO 4 UNITS: 100 INJECTION, SOLUTION INTRAVENOUS; SUBCUTANEOUS at 09:24

## 2023-11-16 RX ADMIN — Medication: at 14:34

## 2023-11-16 RX ADMIN — FERROUS SULFATE TAB 325 MG (65 MG ELEMENTAL FE) 325 MG: 325 (65 FE) TAB at 09:21

## 2023-11-16 RX ADMIN — PIPERACILLIN AND TAZOBACTAM 3375 MG: 3; .375 INJECTION, POWDER, FOR SOLUTION INTRAVENOUS at 05:57

## 2023-11-16 RX ADMIN — MIDODRINE HYDROCHLORIDE 10 MG: 5 TABLET ORAL at 14:21

## 2023-11-16 RX ADMIN — SENNOSIDES 8.6 MG: 8.6 TABLET, FILM COATED ORAL at 20:57

## 2023-11-16 RX ADMIN — SODIUM CHLORIDE, PRESERVATIVE FREE 10 ML: 5 INJECTION INTRAVENOUS at 09:28

## 2023-11-16 RX ADMIN — FUROSEMIDE 20 MG: 10 INJECTION, SOLUTION INTRAMUSCULAR; INTRAVENOUS at 09:22

## 2023-11-16 RX ADMIN — FUROSEMIDE 20 MG: 10 INJECTION, SOLUTION INTRAMUSCULAR; INTRAVENOUS at 17:30

## 2023-11-16 RX ADMIN — Medication 2 PUFF: at 08:10

## 2023-11-16 RX ADMIN — INSULIN LISPRO 4 UNITS: 100 INJECTION, SOLUTION INTRAVENOUS; SUBCUTANEOUS at 17:30

## 2023-11-16 RX ADMIN — ENOXAPARIN SODIUM 30 MG: 100 INJECTION SUBCUTANEOUS at 09:22

## 2023-11-16 ASSESSMENT — ENCOUNTER SYMPTOMS
RESPIRATORY NEGATIVE: 1
ABDOMINAL DISTENTION: 1
EYES NEGATIVE: 1
ALLERGIC/IMMUNOLOGIC NEGATIVE: 1

## 2023-11-16 ASSESSMENT — PAIN SCALES - GENERAL
PAINLEVEL_OUTOF10: 0
PAINLEVEL_OUTOF10: 0
PAINLEVEL_OUTOF10: 3
PAINLEVEL_OUTOF10: 0

## 2023-11-16 NOTE — PLAN OF CARE
PHYSICAL THERAPY TREATMENT    Patient: Brenda Client (47 y.o. male)  Date: 11/16/2023  Diagnosis: Adult failure to thrive [R62.7]  Peripheral edema [R60.9]  Generalized weakness [R53.1]  Cellulitis of lower extremity, unspecified laterality [L03.119]  Heart failure (HCC) [I50.9] Heart failure ([address])      Precautions: Fall Risk                    ASSESSMENT:    Patient continues to benefit from skilled PT services and is progressing towards goals. He was able to sustain sitting for a longer duration today and was able to do so with less assistance today. He has agreed to attempt a SNF. PLAN:  Patient continues to benefit from skilled intervention to address the above impairments. Continue treatment per established plan of care. Recommendation for discharge: (in order for the patient to meet his/her long term goals): Therapy up to 5 days/week in Skilled nursing facility    Other factors to consider for discharge: patient's current support system is unable to meet their requirements for physical assistance    IF patient discharges home will need the following DME: patient owns DME required for discharge       SUBJECTIVE:   Patient stated, \"I am doing a little better today. \"    OBJECTIVE DATA SUMMARY:   Critical Behavior:  Orientation  Overall Orientation Status: Within Functional Limits  Orientation Level: Oriented X4  Cognition  Overall Cognitive Status: WNL    Functional Mobility Training:  Bed Mobility:  Bed Mobility Training  Bed Mobility Training: Yes  Overall Level of Assistance: Assist X2;Maximum assistance  Interventions: Verbal cues;Manual cues; Tactile cues  Rolling: Maximum assistance;Assist X2  Supine to Sit: Maximum assistance;Assist X2  Sit to Supine: Moderate assistance;Assist X2  Scooting: Maximum assistance; Additional time  Transfers:     Balance:  Balance  Sitting: With support  Sitting - Static: Poor (constant support)  Sitting - Dynamic: Poor (constant support)   Wheelchair Mobility: Ambulation/Gait Training:        Neuro Re-Education:                    Treatment Session:  Pt was able to sit EOB for entire session today with support and was able to perform sit to supine with moderate assist today which is a progression from total assist.  All other bed mobility was max assist x 2 versus total assist the days prior. Pain Rating:  10/10   Pain Intervention(s):   nursing notified    Activity Tolerance:   Fair     After treatment:   Patient left in no apparent distress in bed and Call bell within reach      COMMUNICATION/EDUCATION:   The patient's plan of care was discussed with: physical therapist, physician, and     Patient Education  Education Given To: Patient  Education Provided: Role of Therapy;Plan of Care; Fall Prevention Strategies      Jamin Hart, PT, DPT  Minutes: 12   Problem: Physical Therapy - Adult  Goal: By Discharge: Performs mobility at highest level of function for planned discharge setting. See evaluation for individualized goals. Description: FUNCTIONAL STATUS PRIOR TO ADMISSION: Patient was modified independent using a rollator for functional mobility. HOME SUPPORT PRIOR TO ADMISSION: The patient lived alone with home aid to provide assistance with homemaking tasks. Physical Therapy Goals  Initiated 11/13/2023  1. Patient will roll side to side in bed with minimal assistance within 7 day(s). 2.  Patient will perform supine to sitting with moderate assistance within 7 day(s). 3.  Patient will perform sit to stand with moderate assistance using the least restrictive device within 7 day(s). 4.  Patient will transfer to and from chair with maximum assistance with the least restrictive device within 7 day(s).    Outcome: Progressing

## 2023-11-16 NOTE — PROGRESS NOTES
Spoke with patient this am about has confusion last pm.  Threw covers in floor reporting that he was leaving, had to redirect patient and became clearer. Concerned about patient ability to return home, not showing effort in moving self in bed, complains of weakness. Voice sounds weak when talking with staff but very strong when on phone with family calls.

## 2023-11-16 NOTE — PROGRESS NOTES
CARDIOLOGY PROGRESS NOTE    REASON FOR CONSULT: HFrEF    REQUESTING PROVIDER: Dr. Brianda Harris:  Generalized weakness    HISTORY OF PRESENT ILLNESS:  Jose Hurt is a 81y.o. year-old male with past medical history significant for HFrEF, s/p PPM, DM, HLD who was evaluated today due to generalized weakness. He initially arrived to the ED for progressive weakness. He denies any chest pain/shortness of breath. Labs notable for leukocytosis, elevated BNP after receiving IVF. TTE reveals EF 43%, moderate aortic stenosis, RVSP 32. Will follow for the above. 11/15: Lying in bed, without acute complaints, blood pressure improved with midodrine. Possible tx for ID. Records from hospital admission course thus far reviewed. Telemetry reviewed. No events overnight. V paced 80's . INPATIENT MEDICATIONS:  Home medications reviewed.     Current Facility-Administered Medications:     [COMPLETED] piperacillin-tazobactam (ZOSYN) 4,500 mg in sodium chloride 0.9 % 100 mL IVPB (mini-bag), 4,500 mg, IntraVENous, Once, Stopped at 11/14/23 0915 **FOLLOWED BY** piperacillin-tazobactam (ZOSYN) 3,375 mg in sodium chloride 0.9 % 50 mL IVPB (mini-bag), 3,375 mg, IntraVENous, Q8H, Remberto Green MD, Last Rate: 12.5 mL/hr at 11/16/23 0557, 3,375 mg at 11/16/23 0557    vancomycin (VANCOCIN) 2000 mg in 400 mL IVPB, 2,000 mg, IntraVENous, Daily, Damon Green MD, Stopped at 11/15/23 1438    vancomycin (VANCOCIN) intermittent dosing (placeholder), 1 each, Other, RX Placeholder, Remberto Green MD    furosemide (LASIX) injection 20 mg, 20 mg, IntraVENous, Daily, Remberto Green MD, 20 mg at 11/15/23 1040    ferrous sulfate (IRON 325) tablet 325 mg, 325 mg, Oral, BID Norma SEYMOUR Goutham R, MD, 325 mg at 11/15/23 1700    midodrine (PROAMATINE) tablet 10 mg, 10 mg, Oral, TID Norma SEYMOUR Goutham R, MD, 10 mg at 11/15/23 2681    glucose chewable tablet 16 g, 4 tablet, Oral, PRN, Lalo Heredia, DO

## 2023-11-16 NOTE — PROGRESS NOTES
Platelets 535 328 - 062 K/uL    MPV 10.6 8.9 - 12.9 FL    Nucleated RBCs 0.0 0.0  WBC    nRBC 0.00 0.00 - 0.01 K/uL    Neutrophils % 73 32 - 75 %    Lymphocytes % 15 12 - 49 %    Monocytes % 8 5 - 13 %    Eosinophils % 1 0 - 7 %    Basophils % 1 0 - 1 %    Immature Granulocytes 2 (H) 0 - 0.5 %    Neutrophils Absolute 9.2 (H) 1.8 - 8.0 K/UL    Lymphocytes Absolute 1.9 0.8 - 3.5 K/UL    Monocytes Absolute 1.0 0.0 - 1.0 K/UL    Eosinophils Absolute 0.1 0.0 - 0.4 K/UL    Basophils Absolute 0.1 0.0 - 0.1 K/UL    Absolute Immature Granulocyte 0.2 (H) 0.00 - 0.04 K/UL    Differential Type AUTOMATED     Magnesium    Collection Time: 11/16/23  5:33 AM   Result Value Ref Range    Magnesium 2.1 1.6 - 2.4 mg/dL   C-Reactive Protein    Collection Time: 11/16/23  5:33 AM   Result Value Ref Range    CRP 30.80 (H) 0.00 - 0.60 mg/dL   POCT Glucose    Collection Time: 11/16/23  7:22 AM   Result Value Ref Range    POC Glucose 134 (H) 65 - 100 mg/dL    Performed by: [employer]    POCT Glucose    Collection Time: 11/16/23 11:35 AM   Result Value Ref Range    POC Glucose 126 (H) 65 - 100 mg/dL    Performed by: Adonica Look         Imaging:  CT ABDOMEN PELVIS WO CONTRAST Additional Contrast? None    Result Date: 11/13/2023  No acute process. XR CHEST PORTABLE    Result Date: 11/13/2023  No acute findings.        Assessment//Plan           Problem List:  Hospital Problems             Last Modified POA    * (Principal) Heart failure ([address]) 11/13/2023 Yes    Adult failure to thrive 11/13/2023 Yes    Type 2 diabetes mellitus without complication, with long-term current use of insulin ([address]) 11/13/2023 Yes    Venous stasis dermatitis of both lower extremities 11/13/2023 Yes    Morbid obesity with BMI of 45.0-49.9, adult ([address]) 11/13/2023 Yes    Leukocytosis 11/14/2023 Yes    Cellulitis 11/14/2023 Yes    Elevated AST (SGOT) 11/14/2023 Yes    Serum total bilirubin elevated 11/14/2023 Yes    Hypotension 11/14/2023 Yes    VIANEY (acute

## 2023-11-16 NOTE — PROGRESS NOTES
CARDIOLOGY PROGRESS NOTE      Patient Name: Stella Mejias  Age: 81 y.o. Gender:male  :1935  MRN: [id number]        Patient seen and examined. This is a patient who is followed for generalized weakness. Seen and examined. C/o generalized discomfort and BLE discomfort and swelling. Reports hx of venous status. Family at bedside reporting outside caregiver manages care of BLE. Notes improvement to BLE today. Does endorse increased confusion at times. Reports wound care was previously managed at the [facility] but did not have a good experience there and was advised bilateral amputation. Denies any chest pain, shortness of breath or palpitations. No other complaints reported. Telemetry reviewed, there were no events noted in the past 24 hours. Pertinent review of systems items noted above, all other systems are negative. Current medications reviewed. Physical Examination    No Known Allergies    Vital signs are stable. Blood pressure 113/63, Pulse 84  No apparent distress. Heart is regular, rate and rhythm. Normal S1, S2,   Lungs are clear upper; diminished bilateral bases likely 2/2 position in bed. Abdomen is soft, nontender, normal bowel sounds. Extremities have + edema, venous stasis with multiple stages of healing    Labs reviewed: All lab results for the last 24 hours reviewed.         Case discussed with Dr. Gwen Rebolledo and our impression and recommendations are as follows:    Acute on Chronic HFrEF exacerbation  - echo: EF 43%% Abnormal DD, moderate AO valve stenosis; no pericardial effusion  - per chart - [facility] without coronary intervention  - Bilateral lower extremities with significant edema 2+  - elevated BNP on admission - on Lasix 20 IV/d  - monitor strict I&Os, daily weights, lytes, and replete as needed: K>4 and Mg>  - will give additional IV 20 Lasix today: BUN/Cr 29/1.21  - Fluid restriction 1.2L  - Alb 2.0 - replete as needed   - Chest Xray - not suggestive of pulmonary edema   - trending BMP   - continue ASA and Toprol 12.5  - holding statin therapy, as below    S/p PPM ([facility])  - paced on tele  - approx 4 years ago  - continue tele    Hypotension - BP stable   Noted improvement on Midodrine 10 TID     BLE:  Ddx: Venous stasis vs. HF component vs cellulitis   Generalized weakness  - CT BLE: Moderate bilateral lower extremity soft soft tissue swelling and  edema. Significant soft tissue swelling overlying the right foot without  evidence for focal fluid collection or abscess. - + Leukocytosis  -  CRP elevated   -  on IV abx - per primary team  - on ASA 81/d  - will give additional dose of IV 20 Lasix   - PT/OT     Elevated LFTs (ALT normal, AST 53 and Alk Phos 159)    Dyslipidemia - holding statin medications in the presence of elevated Liver enzymes, as above    DM    Additional IV Lasix x 1 - if BP permits - increase dosing to BID, as above. Will continue to trend BNP and LFTs. Please reach out with any additional questions. Please do not hesitate to call me or Dr. Magen Arreaga if additional questions arise.     Robin Dong, APRN - NP  11/16/2023

## 2023-11-17 VITALS
RESPIRATION RATE: 20 BRPM | DIASTOLIC BLOOD PRESSURE: 62 MMHG | SYSTOLIC BLOOD PRESSURE: 133 MMHG | HEIGHT: 69 IN | HEART RATE: 73 BPM | TEMPERATURE: 97.5 F | OXYGEN SATURATION: 96 % | BODY MASS INDEX: 43.59 KG/M2 | WEIGHT: 294.3 LBS

## 2023-11-17 LAB
ANION GAP SERPL CALC-SCNC: 4 MMOL/L (ref 5–15)
BASOPHILS # BLD: 0.1 K/UL (ref 0–0.1)
BASOPHILS NFR BLD: 1 % (ref 0–1)
BNP SERPL-MCNC: 1391 PG/ML
BUN SERPL-MCNC: 28 MG/DL (ref 6–20)
BUN/CREAT SERPL: 22 (ref 12–20)
CA-I BLD-MCNC: 9 MG/DL (ref 8.5–10.1)
CHLORIDE SERPL-SCNC: 100 MMOL/L (ref 97–108)
CO2 SERPL-SCNC: 33 MMOL/L (ref 21–32)
CREAT SERPL-MCNC: 1.27 MG/DL (ref 0.7–1.3)
CRP SERPL-MCNC: 18.64 MG/DL (ref 0–0.6)
DATE LAST DOSE: NORMAL
DIFFERENTIAL METHOD BLD: ABNORMAL
DOSE AMOUNT: NORMAL UNITS
EOSINOPHIL # BLD: 0.1 K/UL (ref 0–0.4)
EOSINOPHIL NFR BLD: 1 % (ref 0–7)
ERYTHROCYTE [DISTWIDTH] IN BLOOD BY AUTOMATED COUNT: 16.5 % (ref 11.5–14.5)
GLUCOSE BLD STRIP.AUTO-MCNC: 124 MG/DL (ref 65–100)
GLUCOSE BLD STRIP.AUTO-MCNC: 124 MG/DL (ref 65–100)
GLUCOSE BLD STRIP.AUTO-MCNC: 91 MG/DL (ref 65–100)
GLUCOSE SERPL-MCNC: 101 MG/DL (ref 65–100)
HCT VFR BLD AUTO: 34.3 % (ref 36.6–50.3)
HGB BLD-MCNC: 11.2 G/DL (ref 12.1–17)
IMM GRANULOCYTES # BLD AUTO: 0.3 K/UL (ref 0–0.04)
IMM GRANULOCYTES NFR BLD AUTO: 3 % (ref 0–0.5)
LYMPHOCYTES # BLD: 2 K/UL (ref 0.8–3.5)
LYMPHOCYTES NFR BLD: 20 % (ref 12–49)
MCH RBC QN AUTO: 26.4 PG (ref 26–34)
MCHC RBC AUTO-ENTMCNC: 32.7 G/DL (ref 30–36.5)
MCV RBC AUTO: 80.9 FL (ref 80–99)
MONOCYTES # BLD: 1.2 K/UL (ref 0–1)
MONOCYTES NFR BLD: 12 % (ref 5–13)
NEUTS SEG # BLD: 6.2 K/UL (ref 1.8–8)
NEUTS SEG NFR BLD: 63 % (ref 32–75)
NRBC # BLD: 0.02 K/UL (ref 0–0.01)
NRBC BLD-RTO: 0.2 PER 100 WBC
PERFORMED BY:: ABNORMAL
PERFORMED BY:: ABNORMAL
PERFORMED BY:: NORMAL
PLATELET # BLD AUTO: 329 K/UL (ref 150–400)
PMV BLD AUTO: 10.5 FL (ref 8.9–12.9)
POTASSIUM SERPL-SCNC: 4 MMOL/L (ref 3.5–5.1)
RBC # BLD AUTO: 4.24 M/UL (ref 4.1–5.7)
RBC MORPH BLD: ABNORMAL
SODIUM SERPL-SCNC: 137 MMOL/L (ref 136–145)
VANCOMYCIN SERPL-MCNC: 21.6 UG/ML
WBC # BLD AUTO: 9.9 K/UL (ref 4.1–11.1)

## 2023-11-17 PROCEDURE — 85025 COMPLETE CBC W/AUTO DIFF WBC: CPT

## 2023-11-17 PROCEDURE — 2580000003 HC RX 258: Performed by: HOSPITALIST

## 2023-11-17 PROCEDURE — 83880 ASSAY OF NATRIURETIC PEPTIDE: CPT

## 2023-11-17 PROCEDURE — 82962 GLUCOSE BLOOD TEST: CPT

## 2023-11-17 PROCEDURE — 80048 BASIC METABOLIC PNL TOTAL CA: CPT

## 2023-11-17 PROCEDURE — 6360000002 HC RX W HCPCS: Performed by: HOSPITALIST

## 2023-11-17 PROCEDURE — 80202 ASSAY OF VANCOMYCIN: CPT

## 2023-11-17 PROCEDURE — 94640 AIRWAY INHALATION TREATMENT: CPT

## 2023-11-17 PROCEDURE — 97530 THERAPEUTIC ACTIVITIES: CPT

## 2023-11-17 PROCEDURE — 86140 C-REACTIVE PROTEIN: CPT

## 2023-11-17 PROCEDURE — 6370000000 HC RX 637 (ALT 250 FOR IP): Performed by: HOSPITALIST

## 2023-11-17 PROCEDURE — P9047 ALBUMIN (HUMAN), 25%, 50ML: HCPCS | Performed by: HOSPITALIST

## 2023-11-17 PROCEDURE — 36415 COLL VENOUS BLD VENIPUNCTURE: CPT

## 2023-11-17 RX ORDER — LACTOBACILLUS RHAMNOSUS GG 10B CELL
1 CAPSULE ORAL
Status: DISCONTINUED | OUTPATIENT
Start: 2023-11-18 | End: 2023-11-17 | Stop reason: HOSPADM

## 2023-11-17 RX ORDER — PSEUDOEPHEDRINE HCL 30 MG
100 TABLET ORAL 2 TIMES DAILY
Qty: 60 CAPSULE | Refills: 0 | Status: SHIPPED | OUTPATIENT
Start: 2023-11-17

## 2023-11-17 RX ORDER — LANOLIN ALCOHOL/MO/W.PET/CERES
400 CREAM (GRAM) TOPICAL DAILY
Qty: 30 TABLET | Refills: 0 | Status: SHIPPED | OUTPATIENT
Start: 2023-11-18

## 2023-11-17 RX ORDER — FUROSEMIDE 10 MG/ML
20 INJECTION INTRAMUSCULAR; INTRAVENOUS DAILY
Status: DISCONTINUED | OUTPATIENT
Start: 2023-11-17 | End: 2023-11-17 | Stop reason: HOSPADM

## 2023-11-17 RX ORDER — PETROLATUM,WHITE/LANOLIN
OINTMENT (GRAM) TOPICAL
Qty: 1 EACH | Refills: 0 | Status: SHIPPED | OUTPATIENT
Start: 2023-11-17

## 2023-11-17 RX ORDER — SENNOSIDES A AND B 8.6 MG/1
1 TABLET, FILM COATED ORAL NIGHTLY
Qty: 30 TABLET | Refills: 0 | Status: SHIPPED | OUTPATIENT
Start: 2023-11-17 | End: 2023-12-17

## 2023-11-17 RX ORDER — FERROUS SULFATE 325(65) MG
325 TABLET ORAL 2 TIMES DAILY WITH MEALS
Qty: 30 TABLET | Refills: 3 | Status: SHIPPED | OUTPATIENT
Start: 2023-11-17

## 2023-11-17 RX ORDER — LACTOBACILLUS RHAMNOSUS GG 10B CELL
1 CAPSULE ORAL
Qty: 30 CAPSULE | Refills: 0 | Status: SHIPPED | OUTPATIENT
Start: 2023-11-18

## 2023-11-17 RX ORDER — FUROSEMIDE 20 MG/1
20 TABLET ORAL 2 TIMES DAILY
Qty: 60 TABLET | Refills: 3 | Status: SHIPPED | OUTPATIENT
Start: 2023-11-17

## 2023-11-17 RX ORDER — DOXYCYCLINE HYCLATE 100 MG
100 TABLET ORAL 2 TIMES DAILY
Qty: 20 TABLET | Refills: 0 | Status: SHIPPED | OUTPATIENT
Start: 2023-11-17 | End: 2023-11-27

## 2023-11-17 RX ORDER — LIDOCAINE 4 G/G
1 PATCH TOPICAL EVERY 12 HOURS
Qty: 60 PATCH | Refills: 0 | Status: SHIPPED | OUTPATIENT
Start: 2023-11-17

## 2023-11-17 RX ORDER — ALBUMIN (HUMAN) 12.5 G/50ML
25 SOLUTION INTRAVENOUS ONCE
Status: COMPLETED | OUTPATIENT
Start: 2023-11-17 | End: 2023-11-17

## 2023-11-17 RX ORDER — MIDODRINE HYDROCHLORIDE 10 MG/1
10 TABLET ORAL
Qty: 90 TABLET | Refills: 3 | Status: SHIPPED | OUTPATIENT
Start: 2023-11-17

## 2023-11-17 RX ORDER — AMOXICILLIN AND CLAVULANATE POTASSIUM 875; 125 MG/1; MG/1
1 TABLET, FILM COATED ORAL 2 TIMES DAILY
Qty: 20 TABLET | Refills: 0 | Status: SHIPPED | OUTPATIENT
Start: 2023-11-17 | End: 2023-11-27

## 2023-11-17 RX ADMIN — PIPERACILLIN AND TAZOBACTAM 3375 MG: 3; .375 INJECTION, POWDER, FOR SOLUTION INTRAVENOUS at 06:08

## 2023-11-17 RX ADMIN — SODIUM CHLORIDE, PRESERVATIVE FREE 10 ML: 5 INJECTION INTRAVENOUS at 10:09

## 2023-11-17 RX ADMIN — SODIUM CHLORIDE, PRESERVATIVE FREE 10 ML: 5 INJECTION INTRAVENOUS at 10:06

## 2023-11-17 RX ADMIN — ALBUMIN (HUMAN) 25 G: 0.25 INJECTION, SOLUTION INTRAVENOUS at 10:04

## 2023-11-17 RX ADMIN — MIDODRINE HYDROCHLORIDE 10 MG: 5 TABLET ORAL at 10:07

## 2023-11-17 RX ADMIN — MIDODRINE HYDROCHLORIDE 10 MG: 5 TABLET ORAL at 12:54

## 2023-11-17 RX ADMIN — MICONAZOLE NITRATE: 20 POWDER TOPICAL at 10:10

## 2023-11-17 RX ADMIN — SODIUM CHLORIDE, PRESERVATIVE FREE 10 ML: 5 INJECTION INTRAVENOUS at 11:12

## 2023-11-17 RX ADMIN — FERROUS SULFATE TAB 325 MG (65 MG ELEMENTAL FE) 325 MG: 325 (65 FE) TAB at 10:08

## 2023-11-17 RX ADMIN — ASPIRIN 81 MG: 81 TABLET, COATED ORAL at 10:07

## 2023-11-17 RX ADMIN — FUROSEMIDE 20 MG: 10 INJECTION, SOLUTION INTRAMUSCULAR; INTRAVENOUS at 10:05

## 2023-11-17 RX ADMIN — Medication 2 PUFF: at 09:00

## 2023-11-17 RX ADMIN — DOCUSATE SODIUM 100 MG: 100 CAPSULE, LIQUID FILLED ORAL at 10:08

## 2023-11-17 RX ADMIN — Medication 400 MG: at 10:07

## 2023-11-17 RX ADMIN — ENOXAPARIN SODIUM 30 MG: 100 INJECTION SUBCUTANEOUS at 10:06

## 2023-11-17 RX ADMIN — Medication: at 10:10

## 2023-11-17 ASSESSMENT — PAIN SCALES - GENERAL
PAINLEVEL_OUTOF10: 0
PAINLEVEL_OUTOF10: 2
PAINLEVEL_OUTOF10: 3
PAINLEVEL_OUTOF10: 0

## 2023-11-17 NOTE — PROGRESS NOTES
CARDIOLOGY PROGRESS NOTE      Patient Name: Araseli Riding  Age: 81 y.o. Gender:male  :1935  MRN: [id number]        Patient seen and examined. This is a patient who is followed for generalized weakness. Seen and examined. C/o generalized discomfort and BLE discomfort and swelling. Reports hx of venous status. Family at bedside reporting outside caregiver manages care of BLE. Notes improvement to BLE today. Does endorse increased confusion at times. Reports wound care was previously managed at the [facility] but did not have a good experience there and was advised bilateral amputation. Denies any chest pain, shortness of breath or palpitations. No other complaints reported. : Resting comfortably in bed with significant improvement to lower extremity swelling. No acute complaints at this time. Receiving albumin followed by lasix with good response, continue to trend BMP, planned possible d/c today to [address] [place] [address] and rehab. Will need OP follow up for HF management and device checks. Telemetry reviewed, there were no events noted in the past 24 hours. Pertinent review of systems items noted above, all other systems are negative. Current medications reviewed. Physical Examination    No Known Allergies    Vital signs are stable. Blood pressure 113/63, Pulse 84  No apparent distress. Heart is regular, rate and rhythm. Normal S1, S2,   Lungs are clear upper; diminished bilateral bases   Abdomen is soft, nontender, normal bowel sounds. Extremities have + 1 edema, venous stasis with multiple stages of healing    Labs reviewed: All lab results for the last 24 hours reviewed.         Case discussed with Dr. Tayo Chen and our impression and recommendations are as follows:    Acute on Chronic HFrEF exacerbation  -echo: EF 43%% Abnormal DD, moderate AO valve stenosis; no pericardial effusion  - per chart - [facility] without coronary intervention  - elevated BNP on admission - on Lasix 20 IV/d  - monitor

## 2023-11-17 NOTE — PLAN OF CARE
Problem: Occupational Therapy - Adult  Goal: By Discharge: Performs self-care activities at highest level of function for planned discharge setting. See evaluation for individualized goals. Description: FUNCTIONAL STATUS PRIOR TO ADMISSION:  Patient was ambulatory using rollator prior to onset of symptoms 2 weeks ago. Receives Help From: Friend(s), Personal care attendant, ADL Assistance: Needs assistance,  ,  ,  ,  , Toileting: Needs assistance (incontinent X2 weeks per patient reports), Homemaking Assistance: Needs assistance, Ambulation Assistance: Independent (prior to 2 weeks ago), Transfer Assistance: Independent (prior to 2 weeks ago), Active : No     HOME SUPPORT: Patient lived alone with aide to provide assistance 4 days a week with meal prep and housekeeping and occasional assist from friend with ADL tasks as needed. Occupational Therapy Goals:  Initiated 11/13/2023  1. Patient will perform bathing with Minimal Assist within 7 day(s). 2.  Patient will perform upper body dressing with Set-up within 7 day(s). 3.  Patient will perform lower body dressing with Minimal Assist within 7 day(s). 4.  Patient will perform toilet transfers with Minimal Assist  within 7 day(s). 5.  Patient will perform all aspects of toileting with Minimal Assist within 7 day(s). 6.  Patient will participate in upper extremity therapeutic exercise/activities with Modified [place] for 10 minutes within 7 day(s). 7.  Patient will utilize energy conservation techniques during functional activities with verbal and visual cues within 7 day(s).    11/17/2023 1026 by Sanam Lewis OT  Outcome: Progressing

## 2023-11-17 NOTE — RT PROTOCOL NOTE
Patient c/o pain in back, legs, arm any time attempting to move. Patient requires maximum assist and much encouragement in assisting with turning. Patient is able to feed himself once tray is set up. Has had frequent naps with staff interrupting for care.

## 2023-11-17 NOTE — PROGRESS NOTES
[facility] to transport patient to [facility].  Report called to Zohreh Reed LPN

## 2023-11-17 NOTE — CARE COORDINATION
Patient has been accepted at [facility], nurse to call report to [phone].  patient has straight Medicare, no auth needed for Transportation

## 2023-11-17 NOTE — DISCHARGE SUMMARY
Physician Discharge Summary       Patient: Christiano Martinez MRN: [id number]     YOB: 1935  Age: 81 y.o. Sex: male    PCP: Abdul Boast, MD    Allergies: Patient has no known allergies. Admit date: 11/13/2023  Admitting Provider: Rogelio Ellis MD    Discharge date: 11/17/2023  Discharging Provider: Rogelio Ellis MD    * Admission Diagnoses:   Adult failure to thrive [R62.7]  Peripheral edema [R60.9]  Generalized weakness [R53.1]  Cellulitis of lower extremity, unspecified laterality [U00.638]  Heart failure ([address]) [I50.9]      * Discharge Diagnoses: Active Hospital Problems    Leukocytosis      Cellulitis      Elevated AST (SGOT)      Serum total bilirubin elevated      Hypotension      VIANEY (acute kidney injury) ([address])      Adult failure to thrive      Type 2 diabetes mellitus without complication, with long-term current use of insulin (HCC)      Venous stasis dermatitis of both lower extremities      Morbid obesity with BMI of 45.0-49.9, adult ([address])      Heart failure [facility])           Chief Complaint   Patient presents with    Generalized Body Aches    Extremity Weakness        Presentation on 11/13/2023  HPI as per admitting provider on 11/13/2023      Christiano Martinez is a 81 y.o. male followed by Abdul Boast, MD and  has a past medical history of Hypercholesteremia, Presence of cardiac pacemaker, Type 2 diabetes mellitus without complication, with long-term current use of insulin ([address]), and Venous stasis dermatitis of both lower extremities. Mostly history was obtained from the patient but at times not the best historian so reviewed notes received from the [place] from primary care as well as cardiology. Patient states that since Friday he has been unable to move out of the chair. There is question of a slip and fall on 11/10 patient was seen emergency room.   Since he was having some was noted pain which he says usually occurs when he is or extremities. Skin:    Skin color, texture, turgor normal.   Lymph nodes: Cervical nodes normal.  Neurologic: CNII-XII intact.  Normal strength, sensation, and reflexes throughout  Labs:    Recent Results (from the past 24 hour(s))   POCT Glucose    Collection Time: 11/16/23  4:53 PM   Result Value Ref Range    POC Glucose 108 (H) 65 - 100 mg/dL    Performed by: Shania Orona    Culture, Blood 1    Collection Time: 11/16/23  7:47 PM    Specimen: Blood   Result Value Ref Range    Special Requests No Special Requests      Culture No growth after 9 hours     Culture, Blood 2    Collection Time: 11/16/23  7:47 PM    Specimen: Blood   Result Value Ref Range    Special Requests No Special Requests      Culture No growth after 9 hours     POCT Glucose    Collection Time: 11/16/23  9:13 PM   Result Value Ref Range    POC Glucose 97 65 - 100 mg/dL    Performed by: Levi Mckeon    POCT Glucose    Collection Time: 11/17/23  3:26 AM   Result Value Ref Range    POC Glucose 91 65 - 100 mg/dL    Performed by: Levi Mckeon    Vancomycin Level, Random    Collection Time: 11/17/23  5:22 AM   Result Value Ref Range    Vancomycin Rm 21.6 ug/mL    Dose Date/Time Not provided      DOSE AMOUNT Not provided Units   CBC with Auto Differential    Collection Time: 11/17/23  5:22 AM   Result Value Ref Range    WBC 9.9 4.1 - 11.1 K/uL    RBC 4.24 4.10 - 5.70 M/uL    Hemoglobin 11.2 (L) 12.1 - 17.0 g/dL    Hematocrit 34.3 (L) 36.6 - 50.3 %    MCV 80.9 80.0 - 99.0 FL    MCH 26.4 26.0 - 34.0 PG    MCHC 32.7 30.0 - 36.5 g/dL    RDW 16.5 (H) 11.5 - 14.5 %    Platelets 407 213 - 885 K/uL    MPV 10.5 8.9 - 12.9 FL    Nucleated RBCs 0.2 (H) 0.0  WBC    nRBC 0.02 (H) 0.00 - 0.01 K/uL    Neutrophils % 63 32 - 75 %    Lymphocytes % 20 12 - 49 %    Monocytes % 12 5 - 13 %    Eosinophils % 1 0 - 7 %    Basophils % 1 0 - 1 %    Immature Granulocytes 3 (H) 0 - 0.5 %    Neutrophils Absolute 6.2 1.8 - 8.0 K/UL    Lymphocytes Absolute 2.0 0.8 - 3.5 K/UL

## 2023-11-17 NOTE — PROGRESS NOTES
Care was resumed after receiving report from the offgoing shift. The pt was resting in bed with his eyes closed,but he is arousable with calling his name. He continue to moan out at times with no specific voiced. he is showing no diabetic symptoms. He continue to have edema to his lower ext. There is some weeping noted from his legs. He continue to moan out with the least amt of movement. He is being assisted as needed & will let you do.

## 2023-11-17 NOTE — PROGRESS NOTES
Physician Progress Note      PATIENT:               Rochelle Rabago  CSN #:                  [id number]  :                       1935  ADMIT DATE:       2023 4:27 AM  DISCH DATE:  RESPONDING  PROVIDER #:        Dougie Burt MD          QUERY TEXT:    Pt admitted with acute on chronic CHF. Within 24 hour s of admission pt noted   to have cellulitis, WBC 19.2, SBP<90, Procal 20.77, and CRP 26.99. If   possible, please document in the progress notes and discharge summary if you   are evaluating and /or treating any of the following: The medical record reflects the following:  Risk Factors: 82 y/o, cellulitis, CHF,    Clinical Indicators: Within 24 hours of admission:  * Cellulitis  * WBC 19.2  * SBP<90 down to 86  * Procal 20.77  * CRP 26.99    Treatment: IV Vanc, serial CBCs, CRPs, Procals,    Thank you,  Merced ROJAS, RN, CRCR  Please contact me for any questions or concerns regarding this query at   [email or web address]  Options provided:  -- Sepsis, present on admission  -- Cellulitis without Sepsis  -- Other - I will add my own diagnosis  -- Disagree - Not applicable / Not valid  -- Disagree - Clinically unable to determine / Unknown  -- Refer to Clinical Documentation Reviewer    PROVIDER RESPONSE TEXT:    This patient has sepsis which was present on admission.     Query created by: Merced Anne on 2023 4:08 PM      Electronically signed by:  Dougie Burt MD 2023 12:53 PM

## 2023-11-17 NOTE — PROGRESS NOTES
The pt have rested fairly well during the night. He does moan out at times,but voices no specific c/o. He continue to yell out with the least amount of touching him.

## 2023-11-19 LAB
BACTERIA SPEC CULT: NORMAL
BACTERIA SPEC CULT: NORMAL
Lab: NORMAL
Lab: NORMAL

## 2023-11-22 ENCOUNTER — HOSPITAL ENCOUNTER (EMERGENCY)
Facility: HOSPITAL | Age: 88
Discharge: HOME | End: 2023-11-22
Attending: EMERGENCY MEDICINE
Payer: OTHER GOVERNMENT

## 2023-11-22 ENCOUNTER — APPOINTMENT (OUTPATIENT)
Facility: HOSPITAL | Age: 88
End: 2023-11-22
Payer: OTHER GOVERNMENT

## 2023-11-22 ENCOUNTER — APPOINTMENT (OUTPATIENT)
Facility: HOSPITAL | Age: 88
End: 2023-11-22
Attending: EMERGENCY MEDICINE
Payer: OTHER GOVERNMENT

## 2023-11-22 VITALS
RESPIRATION RATE: 25 BRPM | DIASTOLIC BLOOD PRESSURE: 73 MMHG | OXYGEN SATURATION: 96 % | HEART RATE: 90 BPM | TEMPERATURE: 97.8 F | SYSTOLIC BLOOD PRESSURE: 137 MMHG | BODY MASS INDEX: 50.36 KG/M2 | WEIGHT: 295 LBS | HEIGHT: 64 IN

## 2023-11-22 DIAGNOSIS — M79.605 LEFT LEG PAIN: Primary | ICD-10-CM

## 2023-11-22 LAB
ANION GAP SERPL CALC-SCNC: 8 MMOL/L (ref 5–15)
APPEARANCE UR: CLEAR
BACTERIA SPEC CULT: NORMAL
BACTERIA SPEC CULT: NORMAL
BACTERIA URNS QL MICRO: NEGATIVE /HPF
BASOPHILS # BLD: 0.1 K/UL (ref 0–0.1)
BASOPHILS NFR BLD: 1 % (ref 0–1)
BILIRUB UR QL: NEGATIVE
BNP SERPL-MCNC: 270 PG/ML
BUN SERPL-MCNC: 24 MG/DL (ref 6–20)
BUN/CREAT SERPL: 25 (ref 12–20)
CA-I BLD-MCNC: 9.9 MG/DL (ref 8.5–10.1)
CHLORIDE SERPL-SCNC: 99 MMOL/L (ref 97–108)
CO2 SERPL-SCNC: 31 MMOL/L (ref 21–32)
COLOR UR: ABNORMAL
CREAT SERPL-MCNC: 0.95 MG/DL (ref 0.7–1.3)
DIFFERENTIAL METHOD BLD: ABNORMAL
ECHO BSA: 2.46 M2
EOSINOPHIL # BLD: 0.1 K/UL (ref 0–0.4)
EOSINOPHIL NFR BLD: 1 % (ref 0–7)
ERYTHROCYTE [DISTWIDTH] IN BLOOD BY AUTOMATED COUNT: 16.9 % (ref 11.5–14.5)
GLUCOSE SERPL-MCNC: 114 MG/DL (ref 65–100)
GLUCOSE UR STRIP.AUTO-MCNC: >1000 MG/DL
HCT VFR BLD AUTO: 37.4 % (ref 36.6–50.3)
HGB BLD-MCNC: 11.9 G/DL (ref 12.1–17)
HGB UR QL STRIP: NEGATIVE
IMM GRANULOCYTES # BLD AUTO: 0.1 K/UL (ref 0–0.04)
IMM GRANULOCYTES NFR BLD AUTO: 1 % (ref 0–0.5)
KETONES UR QL STRIP.AUTO: 15 MG/DL
LEUKOCYTE ESTERASE UR QL STRIP.AUTO: ABNORMAL
LYMPHOCYTES # BLD: 1.4 K/UL (ref 0.8–3.5)
LYMPHOCYTES NFR BLD: 13 % (ref 12–49)
Lab: NORMAL
Lab: NORMAL
MCH RBC QN AUTO: 26.1 PG (ref 26–34)
MCHC RBC AUTO-ENTMCNC: 31.8 G/DL (ref 30–36.5)
MCV RBC AUTO: 82 FL (ref 80–99)
MONOCYTES # BLD: 0.7 K/UL (ref 0–1)
MONOCYTES NFR BLD: 7 % (ref 5–13)
NEUTS SEG # BLD: 8.2 K/UL (ref 1.8–8)
NEUTS SEG NFR BLD: 77 % (ref 32–75)
NITRITE UR QL STRIP.AUTO: NEGATIVE
NRBC # BLD: 0 K/UL (ref 0–0.01)
NRBC BLD-RTO: 0 PER 100 WBC
PH UR STRIP: 6 (ref 5–8)
PLATELET # BLD AUTO: 458 K/UL (ref 150–400)
PMV BLD AUTO: 10.5 FL (ref 8.9–12.9)
POTASSIUM SERPL-SCNC: 3.9 MMOL/L (ref 3.5–5.1)
PROT UR STRIP-MCNC: NEGATIVE MG/DL
RBC # BLD AUTO: 4.56 M/UL (ref 4.1–5.7)
RBC #/AREA URNS HPF: ABNORMAL /HPF (ref 0–3)
RBC MORPH BLD: ABNORMAL
SODIUM SERPL-SCNC: 138 MMOL/L (ref 136–145)
SP GR UR REFRACTOMETRY: 1.02 (ref 1–1.03)
TROPONIN I SERPL HS-MCNC: 27 NG/L (ref 0–76)
UROBILINOGEN UR QL STRIP.AUTO: 0.2 EU/DL (ref 0.2–1)
WBC # BLD AUTO: 10.6 K/UL (ref 4.1–11.1)
WBC URNS QL MICRO: ABNORMAL /HPF (ref 0–5)

## 2023-11-22 PROCEDURE — 73502 X-RAY EXAM HIP UNI 2-3 VIEWS: CPT

## 2023-11-22 PROCEDURE — 93971 EXTREMITY STUDY: CPT

## 2023-11-22 PROCEDURE — 84484 ASSAY OF TROPONIN QUANT: CPT

## 2023-11-22 PROCEDURE — 85025 COMPLETE CBC W/AUTO DIFF WBC: CPT

## 2023-11-22 PROCEDURE — 83880 ASSAY OF NATRIURETIC PEPTIDE: CPT

## 2023-11-22 PROCEDURE — 80048 BASIC METABOLIC PNL TOTAL CA: CPT

## 2023-11-22 PROCEDURE — 99284 EMERGENCY DEPT VISIT MOD MDM: CPT

## 2023-11-22 PROCEDURE — 96374 THER/PROPH/DIAG INJ IV PUSH: CPT

## 2023-11-22 PROCEDURE — 81001 URINALYSIS AUTO W/SCOPE: CPT

## 2023-11-22 PROCEDURE — 6360000002 HC RX W HCPCS: Performed by: EMERGENCY MEDICINE

## 2023-11-22 PROCEDURE — 73560 X-RAY EXAM OF KNEE 1 OR 2: CPT

## 2023-11-22 PROCEDURE — 36415 COLL VENOUS BLD VENIPUNCTURE: CPT

## 2023-11-22 RX ORDER — FUROSEMIDE 10 MG/ML
20 INJECTION INTRAMUSCULAR; INTRAVENOUS
Status: COMPLETED | OUTPATIENT
Start: 2023-11-22 | End: 2023-11-22

## 2023-11-22 RX ADMIN — FUROSEMIDE 20 MG: 10 INJECTION, SOLUTION INTRAMUSCULAR; INTRAVENOUS at 20:03

## 2023-11-22 NOTE — ED TRIAGE NOTES
Patient here from [facility] due to left leg swollen and red same started at unknown   date patient complaining of left hip and leg pain

## 2023-11-22 NOTE — ED PROVIDER NOTES
Medication List        ASK your doctor about these medications      amoxicillin-clavulanate 875-125 MG per tablet  Commonly known as: AUGMENTIN  Take 1 tablet by mouth 2 times daily for 10 days     aspirin 81 MG EC tablet     atorvastatin 80 MG tablet  Commonly known as: LIPITOR     docusate 100 MG Caps  Commonly known as: COLACE, DULCOLAX  Take 100 mg by mouth 2 times daily     doxycycline hyclate 100 MG tablet  Commonly known as: VIBRA-TABS  Take 1 tablet by mouth 2 times daily for 10 days     empagliflozin 25 MG tablet  Commonly known as: JARDIANCE     ferrous sulfate 325 (65 Fe) MG tablet  Commonly known as: IRON 325  Take 1 tablet by mouth 2 times daily (with meals)     fluticasone-salmeterol 100-50 MCG/ACT Aepb diskus inhaler  Commonly known as: ADVAIR     furosemide 20 MG tablet  Commonly known as: Lasix  Take 1 tablet by mouth 2 times daily     lactobacillus capsule  Take 1 capsule by mouth daily (with breakfast)     lidocaine 4 % external patch  Place 1 patch onto the skin every 12 hours     magnesium oxide 400 (240 Mg) MG tablet  Commonly known as: MAG-OX  Take 1 tablet by mouth daily     metFORMIN 500 MG tablet  Commonly known as: GLUCOPHAGE     miconazole 2 % powder  Commonly known as: MICOTIN  Apply topically 2 times daily. midodrine 10 MG tablet  Commonly known as: PROAMATINE  Take 1 tablet by mouth 3 times daily (with meals)     senna 8.6 MG tablet  Commonly known as: SENOKOT  Take 1 tablet by mouth nightly     Ventolin  (90 Base) MCG/ACT inhaler  Generic drug: albuterol sulfate HFA     vitamin A & D ointment  Apply topically as needed. vitamin D 25 MCG (1000 UT) Tabs tablet  Commonly known as: CHOLECALCIFEROL                DISCONTINUED MEDICATIONS:  Discharge Medication List as of 11/22/2023 10:12 PM          I am the Primary Clinician of Record.  Thuy Archibald MD (electronically signed)    (Please note that parts of this dictation were completed with voice recognition

## 2023-11-23 NOTE — ED NOTES
Oral report and discharge paperwork is given to [facility] with [employer]. Patient being moved to their stretcher at this time.       Ashely Finch RN  11/22/23 6424

## 2023-11-23 NOTE — ED NOTES
Report called to [facility] with [address], she denies any questions.       Sang Payne RN  11/22/23 1243

## 2023-11-23 NOTE — ED NOTES
Straight cath attempted with Phong Lorenzo RN, same was unsuccessful d/t patient anatomy restrictions. Patient incontinent of stool, patient cleaned and soiled linens replaced. Patient placed on primofit and connected to suction. Provider aware.       Osiris Brady RN  11/22/23 2030       Osiris Brady RN  11/22/23 2031

## 2024-01-26 ENCOUNTER — HOSPITAL ENCOUNTER (INPATIENT)
Facility: HOSPITAL | Age: 89
LOS: 5 days | Discharge: NURSING, REHAB OR LONG-TERM CARE | DRG: 683 | End: 2024-01-31
Attending: EMERGENCY MEDICINE | Admitting: FAMILY MEDICINE
Payer: MEDICARE

## 2024-01-26 ENCOUNTER — APPOINTMENT (OUTPATIENT)
Facility: HOSPITAL | Age: 89
DRG: 683 | End: 2024-01-26
Attending: EMERGENCY MEDICINE
Payer: MEDICARE

## 2024-01-26 DIAGNOSIS — E87.0 HYPERNATREMIA: Primary | ICD-10-CM

## 2024-01-26 DIAGNOSIS — N17.8 ACUTE RENAL FAILURE WITH OTHER SPECIFIED PATHOLOGICAL LESION IN KIDNEY (HCC): ICD-10-CM

## 2024-01-26 PROBLEM — I50.42 CHRONIC COMBINED SYSTOLIC AND DIASTOLIC HEART FAILURE (HCC): Status: ACTIVE | Noted: 2024-01-26

## 2024-01-26 PROBLEM — E86.0 DEHYDRATION: Status: ACTIVE | Noted: 2024-01-26

## 2024-01-26 PROBLEM — I50.20 HFREF (HEART FAILURE WITH REDUCED EJECTION FRACTION) (HCC): Status: ACTIVE | Noted: 2024-01-26

## 2024-01-26 LAB
ALBUMIN SERPL-MCNC: 2 G/DL (ref 3.5–5)
ALBUMIN/GLOB SERPL: 0.3 (ref 1.1–2.2)
ALP SERPL-CCNC: 134 U/L (ref 45–117)
ALT SERPL-CCNC: 29 U/L (ref 12–78)
ANION GAP SERPL CALC-SCNC: 8 MMOL/L (ref 5–15)
AST SERPL W P-5'-P-CCNC: 57 U/L (ref 15–37)
BASOPHILS # BLD: 0.1 K/UL (ref 0–0.1)
BASOPHILS NFR BLD: 1 % (ref 0–1)
BILIRUB SERPL-MCNC: 1.2 MG/DL (ref 0.2–1)
BUN SERPL-MCNC: 88 MG/DL (ref 6–20)
BUN SERPL-MCNC: 89 MG/DL (ref 6–20)
BUN SERPL-MCNC: 94 MG/DL (ref 6–20)
BUN/CREAT SERPL: 47 (ref 12–20)
BUN/CREAT SERPL: 47 (ref 12–20)
BUN/CREAT SERPL: 49 (ref 12–20)
CA-I BLD-MCNC: 9.2 MG/DL (ref 8.5–10.1)
CA-I BLD-MCNC: 9.3 MG/DL (ref 8.5–10.1)
CA-I BLD-MCNC: 9.8 MG/DL (ref 8.5–10.1)
CHLORIDE SERPL-SCNC: 114 MMOL/L (ref 97–108)
CHLORIDE SERPL-SCNC: 116 MMOL/L (ref 97–108)
CHLORIDE SERPL-SCNC: 117 MMOL/L (ref 97–108)
CO2 SERPL-SCNC: 33 MMOL/L (ref 21–32)
CO2 SERPL-SCNC: 34 MMOL/L (ref 21–32)
CO2 SERPL-SCNC: 34 MMOL/L (ref 21–32)
CREAT SERPL-MCNC: 1.81 MG/DL (ref 0.7–1.3)
CREAT SERPL-MCNC: 1.89 MG/DL (ref 0.7–1.3)
CREAT SERPL-MCNC: 2.02 MG/DL (ref 0.7–1.3)
DIFFERENTIAL METHOD BLD: ABNORMAL
EOSINOPHIL # BLD: 0.2 K/UL (ref 0–0.4)
EOSINOPHIL NFR BLD: 2 % (ref 0–7)
ERYTHROCYTE [DISTWIDTH] IN BLOOD BY AUTOMATED COUNT: 20.7 % (ref 11.5–14.5)
GLOBULIN SER CALC-MCNC: 6.1 G/DL (ref 2–4)
GLUCOSE BLD STRIP.AUTO-MCNC: 125 MG/DL (ref 65–100)
GLUCOSE SERPL-MCNC: 129 MG/DL (ref 65–100)
GLUCOSE SERPL-MCNC: 129 MG/DL (ref 65–100)
GLUCOSE SERPL-MCNC: 133 MG/DL (ref 65–100)
HCT VFR BLD AUTO: 37.4 % (ref 36.6–50.3)
HGB BLD-MCNC: 11.5 G/DL (ref 12.1–17)
IMM GRANULOCYTES # BLD AUTO: 0.1 K/UL (ref 0–0.04)
IMM GRANULOCYTES NFR BLD AUTO: 1 % (ref 0–0.5)
LYMPHOCYTES # BLD: 1.6 K/UL (ref 0.8–3.5)
LYMPHOCYTES NFR BLD: 21 % (ref 12–49)
MAGNESIUM SERPL-MCNC: 2.5 MG/DL (ref 1.6–2.4)
MCH RBC QN AUTO: 25.4 PG (ref 26–34)
MCHC RBC AUTO-ENTMCNC: 30.7 G/DL (ref 30–36.5)
MCV RBC AUTO: 82.7 FL (ref 80–99)
MONOCYTES # BLD: 0.5 K/UL (ref 0–1)
MONOCYTES NFR BLD: 6 % (ref 5–13)
NEUTS SEG # BLD: 5.3 K/UL (ref 1.8–8)
NEUTS SEG NFR BLD: 69 % (ref 32–75)
NRBC # BLD: 0.03 K/UL (ref 0–0.01)
NRBC BLD-RTO: 0.4 PER 100 WBC
PERFORMED BY:: ABNORMAL
PLATELET # BLD AUTO: 305 K/UL (ref 150–400)
PMV BLD AUTO: 11.2 FL (ref 8.9–12.9)
POTASSIUM SERPL-SCNC: 3.1 MMOL/L (ref 3.5–5.1)
POTASSIUM SERPL-SCNC: 3.1 MMOL/L (ref 3.5–5.1)
POTASSIUM SERPL-SCNC: 4 MMOL/L (ref 3.5–5.1)
PROT SERPL-MCNC: 8.1 G/DL (ref 6.4–8.2)
RBC # BLD AUTO: 4.52 M/UL (ref 4.1–5.7)
RBC MORPH BLD: ABNORMAL
SODIUM SERPL-SCNC: 155 MMOL/L (ref 136–145)
SODIUM SERPL-SCNC: 158 MMOL/L (ref 136–145)
SODIUM SERPL-SCNC: 159 MMOL/L (ref 136–145)
WBC # BLD AUTO: 7.8 K/UL (ref 4.1–11.1)

## 2024-01-26 PROCEDURE — 51798 US URINE CAPACITY MEASURE: CPT

## 2024-01-26 PROCEDURE — 74176 CT ABD & PELVIS W/O CONTRAST: CPT

## 2024-01-26 PROCEDURE — 6360000002 HC RX W HCPCS: Performed by: HOSPITALIST

## 2024-01-26 PROCEDURE — 2580000003 HC RX 258: Performed by: EMERGENCY MEDICINE

## 2024-01-26 PROCEDURE — 99285 EMERGENCY DEPT VISIT HI MDM: CPT

## 2024-01-26 PROCEDURE — 83036 HEMOGLOBIN GLYCOSYLATED A1C: CPT

## 2024-01-26 PROCEDURE — 82306 VITAMIN D 25 HYDROXY: CPT

## 2024-01-26 PROCEDURE — 2580000003 HC RX 258: Performed by: HOSPITALIST

## 2024-01-26 PROCEDURE — 85025 COMPLETE CBC W/AUTO DIFF WBC: CPT

## 2024-01-26 PROCEDURE — 82962 GLUCOSE BLOOD TEST: CPT

## 2024-01-26 PROCEDURE — 80048 BASIC METABOLIC PNL TOTAL CA: CPT

## 2024-01-26 PROCEDURE — 80053 COMPREHEN METABOLIC PANEL: CPT

## 2024-01-26 PROCEDURE — 1100000000 HC RM PRIVATE

## 2024-01-26 PROCEDURE — 83735 ASSAY OF MAGNESIUM: CPT

## 2024-01-26 RX ORDER — ACETAMINOPHEN 650 MG/1
650 SUPPOSITORY RECTAL EVERY 6 HOURS PRN
Status: DISCONTINUED | OUTPATIENT
Start: 2024-01-26 | End: 2024-01-31 | Stop reason: HOSPADM

## 2024-01-26 RX ORDER — MAGNESIUM SULFATE IN WATER 40 MG/ML
2000 INJECTION, SOLUTION INTRAVENOUS PRN
Status: DISCONTINUED | OUTPATIENT
Start: 2024-01-26 | End: 2024-01-31 | Stop reason: HOSPADM

## 2024-01-26 RX ORDER — POTASSIUM CHLORIDE 7.45 MG/ML
10 INJECTION INTRAVENOUS PRN
Status: DISCONTINUED | OUTPATIENT
Start: 2024-01-26 | End: 2024-01-27

## 2024-01-26 RX ORDER — DEXTROSE MONOHYDRATE 100 MG/ML
INJECTION, SOLUTION INTRAVENOUS CONTINUOUS PRN
Status: DISCONTINUED | OUTPATIENT
Start: 2024-01-26 | End: 2024-01-31 | Stop reason: HOSPADM

## 2024-01-26 RX ORDER — 0.9 % SODIUM CHLORIDE 0.9 %
1000 INTRAVENOUS SOLUTION INTRAVENOUS
Status: COMPLETED | OUTPATIENT
Start: 2024-01-26 | End: 2024-01-26

## 2024-01-26 RX ORDER — INSULIN LISPRO 100 [IU]/ML
0-4 INJECTION, SOLUTION INTRAVENOUS; SUBCUTANEOUS NIGHTLY
Status: DISCONTINUED | OUTPATIENT
Start: 2024-01-26 | End: 2024-01-27

## 2024-01-26 RX ORDER — POTASSIUM CHLORIDE 20 MEQ/1
40 TABLET, EXTENDED RELEASE ORAL PRN
Status: DISCONTINUED | OUTPATIENT
Start: 2024-01-26 | End: 2024-01-27

## 2024-01-26 RX ORDER — INSULIN LISPRO 100 [IU]/ML
0-4 INJECTION, SOLUTION INTRAVENOUS; SUBCUTANEOUS
Status: DISCONTINUED | OUTPATIENT
Start: 2024-01-27 | End: 2024-01-27

## 2024-01-26 RX ORDER — MIDODRINE HYDROCHLORIDE 5 MG/1
5 TABLET ORAL
Status: DISCONTINUED | OUTPATIENT
Start: 2024-01-27 | End: 2024-01-29

## 2024-01-26 RX ORDER — ONDANSETRON 2 MG/ML
4 INJECTION INTRAMUSCULAR; INTRAVENOUS EVERY 6 HOURS PRN
Status: DISCONTINUED | OUTPATIENT
Start: 2024-01-26 | End: 2024-01-31 | Stop reason: HOSPADM

## 2024-01-26 RX ORDER — POLYETHYLENE GLYCOL 3350 17 G/17G
17 POWDER, FOR SOLUTION ORAL DAILY PRN
Status: DISCONTINUED | OUTPATIENT
Start: 2024-01-26 | End: 2024-01-31 | Stop reason: HOSPADM

## 2024-01-26 RX ORDER — SODIUM CHLORIDE 0.9 % (FLUSH) 0.9 %
5-40 SYRINGE (ML) INJECTION EVERY 12 HOURS SCHEDULED
Status: DISCONTINUED | OUTPATIENT
Start: 2024-01-26 | End: 2024-01-31 | Stop reason: HOSPADM

## 2024-01-26 RX ORDER — INSULIN LISPRO 100 [IU]/ML
0.05 INJECTION, SOLUTION INTRAVENOUS; SUBCUTANEOUS
Status: DISCONTINUED | OUTPATIENT
Start: 2024-01-27 | End: 2024-01-27

## 2024-01-26 RX ORDER — VITAMIN B COMPLEX
1000 TABLET ORAL DAILY
Status: DISCONTINUED | OUTPATIENT
Start: 2024-01-27 | End: 2024-01-27

## 2024-01-26 RX ORDER — SODIUM CHLORIDE 0.9 % (FLUSH) 0.9 %
5-40 SYRINGE (ML) INJECTION PRN
Status: DISCONTINUED | OUTPATIENT
Start: 2024-01-26 | End: 2024-01-31 | Stop reason: HOSPADM

## 2024-01-26 RX ORDER — ONDANSETRON 4 MG/1
4 TABLET, ORALLY DISINTEGRATING ORAL EVERY 8 HOURS PRN
Status: DISCONTINUED | OUTPATIENT
Start: 2024-01-26 | End: 2024-01-31 | Stop reason: HOSPADM

## 2024-01-26 RX ORDER — ACETAMINOPHEN 325 MG/1
650 TABLET ORAL EVERY 6 HOURS PRN
Status: DISCONTINUED | OUTPATIENT
Start: 2024-01-26 | End: 2024-01-31 | Stop reason: HOSPADM

## 2024-01-26 RX ORDER — SODIUM CHLORIDE 9 MG/ML
INJECTION, SOLUTION INTRAVENOUS PRN
Status: DISCONTINUED | OUTPATIENT
Start: 2024-01-26 | End: 2024-01-31 | Stop reason: HOSPADM

## 2024-01-26 RX ORDER — SODIUM CHLORIDE 450 MG/100ML
INJECTION, SOLUTION INTRAVENOUS CONTINUOUS
Status: DISCONTINUED | OUTPATIENT
Start: 2024-01-26 | End: 2024-01-27

## 2024-01-26 RX ORDER — ENOXAPARIN SODIUM 100 MG/ML
30 INJECTION SUBCUTANEOUS 2 TIMES DAILY
Status: DISCONTINUED | OUTPATIENT
Start: 2024-01-26 | End: 2024-01-31 | Stop reason: HOSPADM

## 2024-01-26 RX ADMIN — SODIUM CHLORIDE, PRESERVATIVE FREE 10 ML: 5 INJECTION INTRAVENOUS at 21:40

## 2024-01-26 RX ADMIN — SODIUM CHLORIDE: 4.5 INJECTION, SOLUTION INTRAVENOUS at 20:59

## 2024-01-26 RX ADMIN — SODIUM CHLORIDE 1000 ML: 9 INJECTION, SOLUTION INTRAVENOUS at 15:53

## 2024-01-26 RX ADMIN — ENOXAPARIN SODIUM 30 MG: 100 INJECTION SUBCUTANEOUS at 21:17

## 2024-01-26 RX ADMIN — SODIUM CHLORIDE, PRESERVATIVE FREE 10 ML: 5 INJECTION INTRAVENOUS at 21:02

## 2024-01-26 RX ADMIN — SODIUM CHLORIDE 1000 ML: 9 INJECTION, SOLUTION INTRAVENOUS at 17:06

## 2024-01-26 ASSESSMENT — LIFESTYLE VARIABLES: HOW MANY STANDARD DRINKS CONTAINING ALCOHOL DO YOU HAVE ON A TYPICAL DAY: PATIENT DOES NOT DRINK

## 2024-01-26 ASSESSMENT — PAIN SCALES - GENERAL: PAINLEVEL_OUTOF10: 0

## 2024-01-26 NOTE — ED PROVIDER NOTES
times daily 60 capsule 0    magnesium oxide (MAG-OX) 400 (240 Mg) MG tablet Take 1 tablet by mouth daily 30 tablet 0    midodrine (PROAMATINE) 10 MG tablet Take 1 tablet by mouth 3 times daily (with meals) 90 tablet 3    lactobacillus (CULTURELLE) capsule Take 1 capsule by mouth daily (with breakfast) 30 capsule 0    furosemide (LASIX) 20 MG tablet Take 1 tablet by mouth 2 times daily 60 tablet 3    albuterol sulfate HFA (VENTOLIN HFA) 108 (90 Base) MCG/ACT inhaler Inhale 1 puff into the lungs every 6 hours as needed for Wheezing or Shortness of Breath      aspirin 81 MG EC tablet Take 1 tablet by mouth daily      atorvastatin (LIPITOR) 80 MG tablet Take 1 tablet by mouth daily      vitamin D (CHOLECALCIFEROL) 25 MCG (1000 UT) TABS tablet Take 2 tablets by mouth daily      empagliflozin (JARDIANCE) 25 MG tablet Take 0.5 tablets by mouth daily      fluticasone-salmeterol (ADVAIR) 100-50 MCG/ACT AEPB diskus inhaler Inhale 1 puff into the lungs in the morning and 1 puff in the evening.      metFORMIN (GLUCOPHAGE) 500 MG tablet Take 1 tablet by mouth every evening         Social Determinants of Health:   Social Determinants of Health     Tobacco Use: Low Risk  (1/26/2024)    Patient History     Smoking Tobacco Use: Never     Smokeless Tobacco Use: Never     Passive Exposure: Not on file   Alcohol Use: Not At Risk (11/10/2023)    AUDIT-C     Frequency of Alcohol Consumption: Never     Average Number of Drinks: Patient does not drink     Frequency of Binge Drinking: Never   Financial Resource Strain: Not on file   Food Insecurity: Not on file (11/13/2023)   Transportation Needs: No Transportation Needs (11/17/2023)    Transportation Problems ([facility] HRSN)     In the past 12 months, has lack of reliable transportation kept you from medical appointments, meetings, work or from getting things needed for daily living?: Not on file   Physical Activity: Not on file   Stress: Not on file   Social Connections: Not on file   Intimate

## 2024-01-26 NOTE — ED TRIAGE NOTES
Pt sent from SNF for abnormal lab work. Crit 1.73 and BUN 86. Pt denies pain. Pt at baseline mental status.

## 2024-01-27 LAB
25(OH)D3 SERPL-MCNC: 50.2 NG/ML (ref 30–100)
ANION GAP SERPL CALC-SCNC: 6 MMOL/L (ref 5–15)
ANION GAP SERPL CALC-SCNC: 7 MMOL/L (ref 5–15)
BASOPHILS # BLD: 0.1 K/UL (ref 0–0.1)
BASOPHILS NFR BLD: 1 % (ref 0–1)
BUN SERPL-MCNC: 77 MG/DL (ref 6–20)
BUN SERPL-MCNC: 81 MG/DL (ref 6–20)
BUN/CREAT SERPL: 44 (ref 12–20)
BUN/CREAT SERPL: 46 (ref 12–20)
CA-I BLD-MCNC: 9.2 MG/DL (ref 8.5–10.1)
CA-I BLD-MCNC: 9.3 MG/DL (ref 8.5–10.1)
CHLORIDE SERPL-SCNC: 116 MMOL/L (ref 97–108)
CHLORIDE SERPL-SCNC: 118 MMOL/L (ref 97–108)
CO2 SERPL-SCNC: 34 MMOL/L (ref 21–32)
CO2 SERPL-SCNC: 34 MMOL/L (ref 21–32)
CREAT SERPL-MCNC: 1.77 MG/DL (ref 0.7–1.3)
CREAT SERPL-MCNC: 1.77 MG/DL (ref 0.7–1.3)
DIFFERENTIAL METHOD BLD: ABNORMAL
EOSINOPHIL # BLD: 0.2 K/UL (ref 0–0.4)
EOSINOPHIL NFR BLD: 3 % (ref 0–7)
ERYTHROCYTE [DISTWIDTH] IN BLOOD BY AUTOMATED COUNT: 21.1 % (ref 11.5–14.5)
EST. AVERAGE GLUCOSE BLD GHB EST-MCNC: 137 MG/DL
GLUCOSE BLD STRIP.AUTO-MCNC: 103 MG/DL (ref 65–100)
GLUCOSE BLD STRIP.AUTO-MCNC: 130 MG/DL (ref 65–100)
GLUCOSE BLD STRIP.AUTO-MCNC: 132 MG/DL (ref 65–100)
GLUCOSE BLD STRIP.AUTO-MCNC: 138 MG/DL (ref 65–100)
GLUCOSE SERPL-MCNC: 112 MG/DL (ref 65–100)
GLUCOSE SERPL-MCNC: 141 MG/DL (ref 65–100)
HBA1C MFR BLD: 6.4 % (ref 4–5.6)
HCT VFR BLD AUTO: 32.7 % (ref 36.6–50.3)
HGB BLD-MCNC: 10.2 G/DL (ref 12.1–17)
IMM GRANULOCYTES # BLD AUTO: 0.1 K/UL (ref 0–0.04)
IMM GRANULOCYTES NFR BLD AUTO: 1 % (ref 0–0.5)
INR PPP: 1.1 (ref 0.9–1.1)
LYMPHOCYTES # BLD: 2 K/UL (ref 0.8–3.5)
LYMPHOCYTES NFR BLD: 28 % (ref 12–49)
MCH RBC QN AUTO: 25.6 PG (ref 26–34)
MCHC RBC AUTO-ENTMCNC: 31.2 G/DL (ref 30–36.5)
MCV RBC AUTO: 82.2 FL (ref 80–99)
MONOCYTES # BLD: 0.4 K/UL (ref 0–1)
MONOCYTES NFR BLD: 6 % (ref 5–13)
NEUTS SEG # BLD: 4.3 K/UL (ref 1.8–8)
NEUTS SEG NFR BLD: 61 % (ref 32–75)
NRBC # BLD: 0.02 K/UL (ref 0–0.01)
NRBC BLD-RTO: 0.3 PER 100 WBC
PERFORMED BY:: ABNORMAL
PHOSPHATE SERPL-MCNC: 3.6 MG/DL (ref 2.6–4.7)
PLATELET # BLD AUTO: 282 K/UL (ref 150–400)
PMV BLD AUTO: 10.9 FL (ref 8.9–12.9)
POTASSIUM SERPL-SCNC: 3.1 MMOL/L (ref 3.5–5.1)
POTASSIUM SERPL-SCNC: 3.2 MMOL/L (ref 3.5–5.1)
PROTHROMBIN TIME: 14.1 SEC (ref 11.9–14.6)
RBC # BLD AUTO: 3.98 M/UL (ref 4.1–5.7)
RBC MORPH BLD: ABNORMAL
SODIUM SERPL-SCNC: 156 MMOL/L (ref 136–145)
SODIUM SERPL-SCNC: 159 MMOL/L (ref 136–145)
WBC # BLD AUTO: 7.1 K/UL (ref 4.1–11.1)

## 2024-01-27 PROCEDURE — 36415 COLL VENOUS BLD VENIPUNCTURE: CPT

## 2024-01-27 PROCEDURE — 6370000000 HC RX 637 (ALT 250 FOR IP): Performed by: HOSPITALIST

## 2024-01-27 PROCEDURE — 1100000000 HC RM PRIVATE

## 2024-01-27 PROCEDURE — 80048 BASIC METABOLIC PNL TOTAL CA: CPT

## 2024-01-27 PROCEDURE — 85610 PROTHROMBIN TIME: CPT

## 2024-01-27 PROCEDURE — 6360000002 HC RX W HCPCS: Performed by: HOSPITALIST

## 2024-01-27 PROCEDURE — 2580000003 HC RX 258: Performed by: FAMILY MEDICINE

## 2024-01-27 PROCEDURE — 84100 ASSAY OF PHOSPHORUS: CPT

## 2024-01-27 PROCEDURE — 85025 COMPLETE CBC W/AUTO DIFF WBC: CPT

## 2024-01-27 PROCEDURE — 82962 GLUCOSE BLOOD TEST: CPT

## 2024-01-27 PROCEDURE — 6370000000 HC RX 637 (ALT 250 FOR IP): Performed by: FAMILY MEDICINE

## 2024-01-27 RX ORDER — TRAMADOL HYDROCHLORIDE 50 MG/1
50 TABLET ORAL EVERY 6 HOURS PRN
COMMUNITY

## 2024-01-27 RX ORDER — SODIUM CHLORIDE, SODIUM LACTATE, POTASSIUM CHLORIDE, AND CALCIUM CHLORIDE .6; .31; .03; .02 G/100ML; G/100ML; G/100ML; G/100ML
500 INJECTION, SOLUTION INTRAVENOUS ONCE
Status: COMPLETED | OUTPATIENT
Start: 2024-01-27 | End: 2024-01-27

## 2024-01-27 RX ORDER — DOCUSATE SODIUM 100 MG/1
100 CAPSULE, LIQUID FILLED ORAL 2 TIMES DAILY
COMMUNITY

## 2024-01-27 RX ORDER — POTASSIUM CHLORIDE 20 MEQ/1
40 TABLET, EXTENDED RELEASE ORAL 2 TIMES DAILY
Status: DISCONTINUED | OUTPATIENT
Start: 2024-01-27 | End: 2024-01-31 | Stop reason: HOSPADM

## 2024-01-27 RX ORDER — SENNOSIDES A AND B 8.6 MG/1
1 TABLET, FILM COATED ORAL
COMMUNITY

## 2024-01-27 RX ORDER — VITAMIN B COMPLEX
1000 TABLET ORAL DAILY
Status: DISCONTINUED | OUTPATIENT
Start: 2024-01-27 | End: 2024-01-31 | Stop reason: HOSPADM

## 2024-01-27 RX ORDER — DEXTROSE MONOHYDRATE 50 MG/ML
INJECTION, SOLUTION INTRAVENOUS CONTINUOUS
Status: DISCONTINUED | OUTPATIENT
Start: 2024-01-27 | End: 2024-01-31

## 2024-01-27 RX ORDER — MIRTAZAPINE 15 MG/1
7.5 TABLET, FILM COATED ORAL
Status: ON HOLD | COMMUNITY
End: 2024-01-31

## 2024-01-27 RX ADMIN — Medication 1000 UNITS: at 08:57

## 2024-01-27 RX ADMIN — DEXTROSE MONOHYDRATE: 50 INJECTION, SOLUTION INTRAVENOUS at 08:12

## 2024-01-27 RX ADMIN — MIDODRINE HYDROCHLORIDE 5 MG: 5 TABLET ORAL at 16:53

## 2024-01-27 RX ADMIN — ENOXAPARIN SODIUM 30 MG: 100 INJECTION SUBCUTANEOUS at 08:57

## 2024-01-27 RX ADMIN — SODIUM CHLORIDE, POTASSIUM CHLORIDE, SODIUM LACTATE AND CALCIUM CHLORIDE 500 ML: 600; 310; 30; 20 INJECTION, SOLUTION INTRAVENOUS at 07:12

## 2024-01-27 RX ADMIN — MIDODRINE HYDROCHLORIDE 5 MG: 5 TABLET ORAL at 08:57

## 2024-01-27 RX ADMIN — POTASSIUM CHLORIDE 40 MEQ: 1500 TABLET, EXTENDED RELEASE ORAL at 08:57

## 2024-01-27 RX ADMIN — MIDODRINE HYDROCHLORIDE 5 MG: 5 TABLET ORAL at 12:03

## 2024-01-27 RX ADMIN — ENOXAPARIN SODIUM 30 MG: 100 INJECTION SUBCUTANEOUS at 21:41

## 2024-01-27 RX ADMIN — DEXTROSE MONOHYDRATE: 50 INJECTION, SOLUTION INTRAVENOUS at 18:20

## 2024-01-27 ASSESSMENT — PAIN SCALES - GENERAL
PAINLEVEL_OUTOF10: 5
PAINLEVEL_OUTOF10: 0

## 2024-01-27 NOTE — PLAN OF CARE
Problem: ABCDS Injury Assessment  Goal: Absence of physical injury  Outcome: Progressing     Problem: Skin/Tissue Integrity  Goal: Absence of new skin breakdown  Description: 1.  Monitor for areas of redness and/or skin breakdown  2.  Assess vascular access sites hourly  3.  Every 4-6 hours minimum:  Change oxygen saturation probe site  4.  Every 4-6 hours:  If on nasal continuous positive airway pressure, respiratory therapy assess nares and determine need for appliance change or resting period.  Outcome: Progressing

## 2024-01-27 NOTE — ASSESSMENT & PLAN NOTE
- A1c - 6.3 (11/2023) w/o acute inpt hyperglycemia noted  - Continue Wt based prandial Novolog for inpt management  - Resume low CHO diet control

## 2024-01-27 NOTE — ASSESSMENT & PLAN NOTE
- Aprox 10L free water deficit noted on admission with over diuresis and impaired oral intake the suspected etiology  - 0.45NS IVF support initiated and correct <10meq daily  - Monitor UOP and urine lytes

## 2024-01-27 NOTE — PLAN OF CARE
Problem: ABCDS Injury Assessment  Goal: Absence of physical injury  1/27/2024 1044 by Idania Winston LPN  Outcome: Progressing  1/26/2024 2212 by Shelly Her LPN  Outcome: Progressing     Problem: Skin/Tissue Integrity  Goal: Absence of new skin breakdown  Description: 1.  Monitor for areas of redness and/or skin breakdown  2.  Assess vascular access sites hourly  3.  Every 4-6 hours minimum:  Change oxygen saturation probe site  4.  Every 4-6 hours:  If on nasal continuous positive airway pressure, respiratory therapy assess nares and determine need for appliance change or resting period.  1/27/2024 1044 by Idania Winston LPN  Outcome: Progressing  1/26/2024 2212 by Shelly Her LPN  Outcome: Progressing

## 2024-01-27 NOTE — ASSESSMENT & PLAN NOTE
- Chronic presentation w/ mild impaired LV dysfunction noted on recent TTE and chronic lymphedema the suspected etiology  - Low dose diuretic held given VIANEY  - Additional ET care and lymphedema management

## 2024-01-27 NOTE — RT PROTOCOL NOTE
Patient unable to follow instructions on MDI administration. Will open mouth but will not inhale the medication. Attempt was made numerous times but patient cannot comply. JENI Bravo made aware.

## 2024-01-27 NOTE — H&P
telemedicine encounter that was done with the assistance of the nurse at the bedside.  Communication was done with audio/video using a high-resolution camera.  Auscultation was done using an electronic stethoscope.  Physician was located in his office in [place]  Patient was located in the inpatient unit at [facility] in [place], [place].     Neuro: Aox2 CN intact, SULLIVAN, strength intact, no tremors  Cardio:  RRR    Pulm: CTA    Gastro: Soft, NT, ND BS+, No guarding  Extremity: Mild non pitting edema     Past Medical History:   PMHx   Past Medical History:   Diagnosis Date    HFrEF (heart failure with reduced ejection fraction) ([facility])     Hypercholesteremia     Morbid obesity ([facility])     Presence of cardiac pacemaker 01/01/2020    Type 2 diabetes mellitus without complication, with long-term current use of insulin ([facility])     Venous stasis dermatitis of both lower extremities      PSHX:  has no past surgical history on file.  Allergies: No Known Allergies  Fam HX:  family history is not on file.  Soc HX:   Social History     Socioeconomic History    Marital status: Single     Spouse name: None    Number of children: None    Years of education: None    Highest education level: None   Tobacco Use    Smoking status: Never    Smokeless tobacco: Never   Vaping Use    Vaping Use: Never used   Substance and Sexual Activity    Alcohol use: Not Currently    Drug use: Never     Social Determinants of Health     Food Insecurity: No Food Insecurity (1/26/2024)    Hunger Vital Sign     Worried About Running Out of Food in the Last Year: Never true     Ran Out of Food in the Last Year: Never true   Transportation Needs: No Transportation Needs (1/26/2024)    PRAPARE - Transportation     Lack of Transportation (Medical): No     Lack of Transportation (Non-Medical): No   Housing Stability: Low Risk  (1/26/2024)    Housing Stability Vital Sign     Unable to Pay for Housing in the Last Year: No     Number of

## 2024-01-27 NOTE — ASSESSMENT & PLAN NOTE
- Impaired diastolic filling (Grade I) with w/Mild impaired LVF (EF~ 43%) noted on recent TTE (11/2023) w/o evidence of acute HF present  - Low dose Coreg resumed and Diuretic therapy held given VIANEY  - Continue low sodium diet and monitor Wt

## 2024-01-27 NOTE — ASSESSMENT & PLAN NOTE
- Baseline morbid obesity (BMI>45) with progressive weakness and impaired ambulation reported  - Underlying obesity and deconditioning the reported etiology and SNF placement arrnaged  - Resume PTOT as tolerated

## 2024-01-27 NOTE — ASSESSMENT & PLAN NOTE
- Acute oliguric failure present on admission with pre-renal dz d/t over diuresis and impaired oral intake the suspected etiology  - Continue IVF support, meds adjusted for <CrCl and continue to avoid ACE/NSAID tx  - Serial Bladder scan & Urine lytes ordered  - Additional nephro follow up pending AM Hospitalist review

## 2024-01-28 LAB
ANION GAP SERPL CALC-SCNC: 7 MMOL/L (ref 5–15)
BUN SERPL-MCNC: 66 MG/DL (ref 6–20)
BUN/CREAT SERPL: 40 (ref 12–20)
CA-I BLD-MCNC: 8.9 MG/DL (ref 8.5–10.1)
CHLORIDE SERPL-SCNC: 114 MMOL/L (ref 97–108)
CO2 SERPL-SCNC: 32 MMOL/L (ref 21–32)
CREAT SERPL-MCNC: 1.66 MG/DL (ref 0.7–1.3)
GLUCOSE BLD STRIP.AUTO-MCNC: 100 MG/DL (ref 65–100)
GLUCOSE BLD STRIP.AUTO-MCNC: 107 MG/DL (ref 65–100)
GLUCOSE BLD STRIP.AUTO-MCNC: 116 MG/DL (ref 65–100)
GLUCOSE BLD STRIP.AUTO-MCNC: 123 MG/DL (ref 65–100)
GLUCOSE SERPL-MCNC: 127 MG/DL (ref 65–100)
PERFORMED BY:: ABNORMAL
PERFORMED BY:: NORMAL
POTASSIUM SERPL-SCNC: 3.5 MMOL/L (ref 3.5–5.1)
SODIUM SERPL-SCNC: 153 MMOL/L (ref 136–145)

## 2024-01-28 PROCEDURE — 6360000002 HC RX W HCPCS: Performed by: HOSPITALIST

## 2024-01-28 PROCEDURE — 2580000003 HC RX 258: Performed by: FAMILY MEDICINE

## 2024-01-28 PROCEDURE — 82962 GLUCOSE BLOOD TEST: CPT

## 2024-01-28 PROCEDURE — 80048 BASIC METABOLIC PNL TOTAL CA: CPT

## 2024-01-28 PROCEDURE — 1100000000 HC RM PRIVATE

## 2024-01-28 PROCEDURE — 6370000000 HC RX 637 (ALT 250 FOR IP): Performed by: HOSPITALIST

## 2024-01-28 PROCEDURE — 6370000000 HC RX 637 (ALT 250 FOR IP): Performed by: FAMILY MEDICINE

## 2024-01-28 RX ADMIN — Medication 1000 UNITS: at 08:45

## 2024-01-28 RX ADMIN — DEXTROSE MONOHYDRATE: 50 INJECTION, SOLUTION INTRAVENOUS at 15:54

## 2024-01-28 RX ADMIN — DEXTROSE MONOHYDRATE: 50 INJECTION, SOLUTION INTRAVENOUS at 22:58

## 2024-01-28 RX ADMIN — POTASSIUM CHLORIDE 40 MEQ: 1500 TABLET, EXTENDED RELEASE ORAL at 08:45

## 2024-01-28 RX ADMIN — MIDODRINE HYDROCHLORIDE 5 MG: 5 TABLET ORAL at 16:27

## 2024-01-28 RX ADMIN — DEXTROSE MONOHYDRATE: 50 INJECTION, SOLUTION INTRAVENOUS at 06:03

## 2024-01-28 RX ADMIN — MIDODRINE HYDROCHLORIDE 5 MG: 5 TABLET ORAL at 08:45

## 2024-01-28 RX ADMIN — ENOXAPARIN SODIUM 30 MG: 100 INJECTION SUBCUTANEOUS at 22:00

## 2024-01-28 RX ADMIN — ENOXAPARIN SODIUM 30 MG: 100 INJECTION SUBCUTANEOUS at 08:45

## 2024-01-28 RX ADMIN — MIDODRINE HYDROCHLORIDE 5 MG: 5 TABLET ORAL at 11:40

## 2024-01-28 ASSESSMENT — PAIN SCALES - GENERAL
PAINLEVEL_OUTOF10: 0
PAINLEVEL_OUTOF10: 0

## 2024-01-29 LAB
ANION GAP SERPL CALC-SCNC: 9 MMOL/L (ref 5–15)
BUN SERPL-MCNC: 66 MG/DL (ref 6–20)
BUN/CREAT SERPL: 41 (ref 12–20)
CA-I BLD-MCNC: 8.9 MG/DL (ref 8.5–10.1)
CHLORIDE SERPL-SCNC: 114 MMOL/L (ref 97–108)
CO2 SERPL-SCNC: 28 MMOL/L (ref 21–32)
CREAT SERPL-MCNC: 1.61 MG/DL (ref 0.7–1.3)
GLUCOSE BLD STRIP.AUTO-MCNC: 105 MG/DL (ref 65–100)
GLUCOSE BLD STRIP.AUTO-MCNC: 110 MG/DL (ref 65–100)
GLUCOSE BLD STRIP.AUTO-MCNC: 137 MG/DL (ref 65–100)
GLUCOSE BLD STRIP.AUTO-MCNC: 98 MG/DL (ref 65–100)
GLUCOSE SERPL-MCNC: 112 MG/DL (ref 65–100)
PERFORMED BY:: ABNORMAL
PERFORMED BY:: NORMAL
POTASSIUM SERPL-SCNC: 4.1 MMOL/L (ref 3.5–5.1)
SODIUM SERPL-SCNC: 151 MMOL/L (ref 136–145)

## 2024-01-29 PROCEDURE — 80048 BASIC METABOLIC PNL TOTAL CA: CPT

## 2024-01-29 PROCEDURE — 6370000000 HC RX 637 (ALT 250 FOR IP): Performed by: FAMILY MEDICINE

## 2024-01-29 PROCEDURE — 94640 AIRWAY INHALATION TREATMENT: CPT

## 2024-01-29 PROCEDURE — 82962 GLUCOSE BLOOD TEST: CPT

## 2024-01-29 PROCEDURE — 6360000002 HC RX W HCPCS: Performed by: HOSPITALIST

## 2024-01-29 PROCEDURE — 6370000000 HC RX 637 (ALT 250 FOR IP): Performed by: HOSPITALIST

## 2024-01-29 PROCEDURE — 2580000003 HC RX 258: Performed by: HOSPITALIST

## 2024-01-29 PROCEDURE — 2580000003 HC RX 258: Performed by: FAMILY MEDICINE

## 2024-01-29 PROCEDURE — 1100000000 HC RM PRIVATE

## 2024-01-29 RX ORDER — 0.9 % SODIUM CHLORIDE 0.9 %
500 INTRAVENOUS SOLUTION INTRAVENOUS ONCE
Status: COMPLETED | OUTPATIENT
Start: 2024-01-29 | End: 2024-01-29

## 2024-01-29 RX ORDER — MIDODRINE HYDROCHLORIDE 5 MG/1
10 TABLET ORAL
Status: DISCONTINUED | OUTPATIENT
Start: 2024-01-29 | End: 2024-01-31 | Stop reason: HOSPADM

## 2024-01-29 RX ADMIN — DEXTROSE MONOHYDRATE: 50 INJECTION, SOLUTION INTRAVENOUS at 11:51

## 2024-01-29 RX ADMIN — SODIUM CHLORIDE 500 ML: 9 INJECTION, SOLUTION INTRAVENOUS at 19:25

## 2024-01-29 RX ADMIN — Medication 1000 UNITS: at 09:09

## 2024-01-29 RX ADMIN — SODIUM CHLORIDE, PRESERVATIVE FREE 10 ML: 5 INJECTION INTRAVENOUS at 21:51

## 2024-01-29 RX ADMIN — MIDODRINE HYDROCHLORIDE 10 MG: 5 TABLET ORAL at 08:02

## 2024-01-29 RX ADMIN — Medication 2 PUFF: at 19:10

## 2024-01-29 RX ADMIN — Medication 2 PUFF: at 09:08

## 2024-01-29 RX ADMIN — ENOXAPARIN SODIUM 30 MG: 100 INJECTION SUBCUTANEOUS at 09:09

## 2024-01-29 RX ADMIN — SODIUM CHLORIDE, PRESERVATIVE FREE 10 ML: 5 INJECTION INTRAVENOUS at 09:09

## 2024-01-29 RX ADMIN — MIDODRINE HYDROCHLORIDE 10 MG: 5 TABLET ORAL at 16:26

## 2024-01-29 RX ADMIN — POTASSIUM CHLORIDE 40 MEQ: 1500 TABLET, EXTENDED RELEASE ORAL at 21:50

## 2024-01-29 RX ADMIN — MIDODRINE HYDROCHLORIDE 10 MG: 5 TABLET ORAL at 12:01

## 2024-01-29 RX ADMIN — POTASSIUM CHLORIDE 40 MEQ: 1500 TABLET, EXTENDED RELEASE ORAL at 09:09

## 2024-01-29 RX ADMIN — ENOXAPARIN SODIUM 30 MG: 100 INJECTION SUBCUTANEOUS at 21:50

## 2024-01-29 ASSESSMENT — PAIN SCALES - GENERAL
PAINLEVEL_OUTOF10: 0
PAINLEVEL_OUTOF10: 0

## 2024-01-29 NOTE — CARE COORDINATION
CM reviewed chart. CM unable to complete assessment due to patient not being oriented. CM attempted to call nephew at [phone], no answer. Patient from [facility].

## 2024-01-30 ENCOUNTER — APPOINTMENT (OUTPATIENT)
Facility: HOSPITAL | Age: 89
DRG: 683 | End: 2024-01-30
Attending: FAMILY MEDICINE
Payer: MEDICARE

## 2024-01-30 LAB
AMORPH CRY URNS QL MICRO: ABNORMAL
APPEARANCE UR: CLEAR
BACTERIA URNS QL MICRO: ABNORMAL /HPF
BILIRUB UR QL: NEGATIVE
COLOR UR: YELLOW
GLUCOSE BLD STRIP.AUTO-MCNC: 103 MG/DL (ref 65–100)
GLUCOSE BLD STRIP.AUTO-MCNC: 452 MG/DL (ref 65–100)
GLUCOSE BLD STRIP.AUTO-MCNC: 91 MG/DL (ref 65–100)
GLUCOSE BLD STRIP.AUTO-MCNC: 92 MG/DL (ref 65–100)
GLUCOSE BLD STRIP.AUTO-MCNC: 93 MG/DL (ref 65–100)
GLUCOSE BLD STRIP.AUTO-MCNC: 95 MG/DL (ref 65–100)
GLUCOSE UR STRIP.AUTO-MCNC: NEGATIVE MG/DL
HGB UR QL STRIP: NEGATIVE
KETONES UR QL STRIP.AUTO: NEGATIVE MG/DL
LEUKOCYTE ESTERASE UR QL STRIP.AUTO: ABNORMAL
NITRITE UR QL STRIP.AUTO: NEGATIVE
PERFORMED BY:: ABNORMAL
PERFORMED BY:: ABNORMAL
PERFORMED BY:: NORMAL
PH UR STRIP: 5.5 (ref 5–8)
PROT UR STRIP-MCNC: 30 MG/DL
RBC #/AREA URNS HPF: ABNORMAL /HPF (ref 0–3)
SP GR UR REFRACTOMETRY: 1.01 (ref 1–1.03)
URINE CULTURE IF INDICATED: ABNORMAL
UROBILINOGEN UR QL STRIP.AUTO: 0.2 EU/DL (ref 0.2–1)
WBC URNS QL MICRO: ABNORMAL /HPF (ref 0–5)

## 2024-01-30 PROCEDURE — 70450 CT HEAD/BRAIN W/O DYE: CPT

## 2024-01-30 PROCEDURE — 2580000003 HC RX 258: Performed by: HOSPITALIST

## 2024-01-30 PROCEDURE — 2580000003 HC RX 258: Performed by: FAMILY MEDICINE

## 2024-01-30 PROCEDURE — 87086 URINE CULTURE/COLONY COUNT: CPT

## 2024-01-30 PROCEDURE — 94640 AIRWAY INHALATION TREATMENT: CPT

## 2024-01-30 PROCEDURE — 6360000002 HC RX W HCPCS: Performed by: HOSPITALIST

## 2024-01-30 PROCEDURE — 82962 GLUCOSE BLOOD TEST: CPT

## 2024-01-30 PROCEDURE — 81001 URINALYSIS AUTO W/SCOPE: CPT

## 2024-01-30 PROCEDURE — 6370000000 HC RX 637 (ALT 250 FOR IP): Performed by: FAMILY MEDICINE

## 2024-01-30 PROCEDURE — 1100000000 HC RM PRIVATE

## 2024-01-30 PROCEDURE — 6370000000 HC RX 637 (ALT 250 FOR IP): Performed by: HOSPITALIST

## 2024-01-30 RX ADMIN — POTASSIUM CHLORIDE 40 MEQ: 1500 TABLET, EXTENDED RELEASE ORAL at 21:20

## 2024-01-30 RX ADMIN — POTASSIUM CHLORIDE 40 MEQ: 1500 TABLET, EXTENDED RELEASE ORAL at 08:36

## 2024-01-30 RX ADMIN — WATER 1000 MG: 1 INJECTION INTRAMUSCULAR; INTRAVENOUS; SUBCUTANEOUS at 13:47

## 2024-01-30 RX ADMIN — MIDODRINE HYDROCHLORIDE 10 MG: 5 TABLET ORAL at 16:38

## 2024-01-30 RX ADMIN — SODIUM CHLORIDE, PRESERVATIVE FREE 10 ML: 5 INJECTION INTRAVENOUS at 10:46

## 2024-01-30 RX ADMIN — DEXTROSE MONOHYDRATE: 50 INJECTION, SOLUTION INTRAVENOUS at 18:15

## 2024-01-30 RX ADMIN — Medication 2 PUFF: at 19:10

## 2024-01-30 RX ADMIN — DEXTROSE MONOHYDRATE: 50 INJECTION, SOLUTION INTRAVENOUS at 03:37

## 2024-01-30 RX ADMIN — MIDODRINE HYDROCHLORIDE 10 MG: 5 TABLET ORAL at 08:36

## 2024-01-30 RX ADMIN — ENOXAPARIN SODIUM 30 MG: 100 INJECTION SUBCUTANEOUS at 21:20

## 2024-01-30 RX ADMIN — MIDODRINE HYDROCHLORIDE 10 MG: 5 TABLET ORAL at 12:27

## 2024-01-30 RX ADMIN — Medication 1000 UNITS: at 08:36

## 2024-01-30 RX ADMIN — Medication 2 PUFF: at 09:07

## 2024-01-30 RX ADMIN — SODIUM CHLORIDE, PRESERVATIVE FREE 10 ML: 5 INJECTION INTRAVENOUS at 22:35

## 2024-01-30 RX ADMIN — ENOXAPARIN SODIUM 30 MG: 100 INJECTION SUBCUTANEOUS at 08:35

## 2024-01-30 ASSESSMENT — ENCOUNTER SYMPTOMS
ALLERGIC/IMMUNOLOGIC NEGATIVE: 1
RESPIRATORY NEGATIVE: 1
GASTROINTESTINAL NEGATIVE: 1
EYES NEGATIVE: 1

## 2024-01-30 ASSESSMENT — PAIN SCALES - GENERAL
PAINLEVEL_OUTOF10: 0

## 2024-01-30 NOTE — CARE COORDINATION
01/30/24 0926   Service Assessment   Patient Orientation Alert and Oriented;Person   Cognition Alert   History Provided By Child/Family   Primary Caregiver Other (Comment)  (nursing home staff)   Support Systems Family Members  (SNF staff)   Patient's Healthcare Decision Maker is: Legal Next of Kin   PCP Verified by CM Yes   Last Visit to PCP Within last 3 months   Prior Functional Level Assistance with the following:;Bathing;Dressing;Toileting;Feeding;Cooking;Housework;Shopping;Mobility   Current Functional Level Assistance with the following:;Bathing;Dressing;Toileting;Feeding;Cooking;Housework;Shopping;Mobility   Can patient return to prior living arrangement Yes   Ability to make needs known: Poor   Family able to assist with home care needs: No   Would you like for me to discuss the discharge plan with any other family members/significant others, and if so, who? Yes   Financial Resources Medicare   Social/Functional History   Lives With Other (comment)  (SNF)   ADL Assistance Needs assistance   Ambulation Assistance Needs assistance   Transfer Assistance Needs assistance   Active  No   Discharge Planning   Type of Residence Skilled Nursing Facility   Patient expects to be discharged to: Skilled nursing facility   Services At/After Discharge   Mode of Transport at Discharge BLS   Confirm Follow Up Transport Other (see comment)  ([facility])   Condition of Participation: Discharge Planning   Freedom of Choice list was provided with basic dialogue that supports the patient's individualized plan of care/goals, treatment preferences, and shares the quality data associated with the providers?  Yes     CM met with the patient's nephew, Alvarez Hooker, at the bedside. Patient has been at [facility] for skilled care for the past 2 months. Per family patient will return to SNF on discharge.     CM also spoke with Admissions at [facility]. They indicated patient only has Medicare

## 2024-01-31 VITALS
RESPIRATION RATE: 20 BRPM | HEIGHT: 64 IN | TEMPERATURE: 98.2 F | SYSTOLIC BLOOD PRESSURE: 116 MMHG | DIASTOLIC BLOOD PRESSURE: 76 MMHG | WEIGHT: 295 LBS | HEART RATE: 78 BPM | OXYGEN SATURATION: 99 % | BODY MASS INDEX: 50.36 KG/M2

## 2024-01-31 LAB
ANION GAP SERPL CALC-SCNC: 5 MMOL/L (ref 5–15)
BASOPHILS # BLD: 0.1 K/UL (ref 0–0.1)
BASOPHILS NFR BLD: 1 % (ref 0–1)
BUN SERPL-MCNC: 45 MG/DL (ref 6–20)
BUN/CREAT SERPL: 30 (ref 12–20)
CA-I BLD-MCNC: 8.8 MG/DL (ref 8.5–10.1)
CHLORIDE SERPL-SCNC: 112 MMOL/L (ref 97–108)
CO2 SERPL-SCNC: 27 MMOL/L (ref 21–32)
CREAT SERPL-MCNC: 1.49 MG/DL (ref 0.7–1.3)
DIFFERENTIAL METHOD BLD: ABNORMAL
EOSINOPHIL # BLD: 0.2 K/UL (ref 0–0.4)
EOSINOPHIL NFR BLD: 4 % (ref 0–7)
ERYTHROCYTE [DISTWIDTH] IN BLOOD BY AUTOMATED COUNT: 21.5 % (ref 11.5–14.5)
GLUCOSE BLD STRIP.AUTO-MCNC: 115 MG/DL (ref 65–100)
GLUCOSE BLD STRIP.AUTO-MCNC: 115 MG/DL (ref 65–100)
GLUCOSE BLD STRIP.AUTO-MCNC: 124 MG/DL (ref 65–100)
GLUCOSE SERPL-MCNC: 114 MG/DL (ref 65–100)
HCT VFR BLD AUTO: 29.1 % (ref 36.6–50.3)
HGB BLD-MCNC: 9.3 G/DL (ref 12.1–17)
IMM GRANULOCYTES # BLD AUTO: 0.1 K/UL (ref 0–0.04)
IMM GRANULOCYTES NFR BLD AUTO: 1 % (ref 0–0.5)
LYMPHOCYTES # BLD: 1.8 K/UL (ref 0.8–3.5)
LYMPHOCYTES NFR BLD: 32 % (ref 12–49)
MCH RBC QN AUTO: 25.8 PG (ref 26–34)
MCHC RBC AUTO-ENTMCNC: 32 G/DL (ref 30–36.5)
MCV RBC AUTO: 80.6 FL (ref 80–99)
MONOCYTES # BLD: 0.4 K/UL (ref 0–1)
MONOCYTES NFR BLD: 7 % (ref 5–13)
NEUTS SEG # BLD: 2.9 K/UL (ref 1.8–8)
NEUTS SEG NFR BLD: 55 % (ref 32–75)
NRBC # BLD: 0 K/UL (ref 0–0.01)
NRBC BLD-RTO: 0 PER 100 WBC
PERFORMED BY:: ABNORMAL
PLATELET # BLD AUTO: 280 K/UL (ref 150–400)
PMV BLD AUTO: 10.9 FL (ref 8.9–12.9)
POTASSIUM SERPL-SCNC: 4.9 MMOL/L (ref 3.5–5.1)
RBC # BLD AUTO: 3.61 M/UL (ref 4.1–5.7)
RBC MORPH BLD: ABNORMAL
RBC MORPH BLD: ABNORMAL
SODIUM SERPL-SCNC: 144 MMOL/L (ref 136–145)
WBC # BLD AUTO: 5.5 K/UL (ref 4.1–11.1)

## 2024-01-31 PROCEDURE — 6370000000 HC RX 637 (ALT 250 FOR IP): Performed by: FAMILY MEDICINE

## 2024-01-31 PROCEDURE — 2580000003 HC RX 258: Performed by: FAMILY MEDICINE

## 2024-01-31 PROCEDURE — 6360000002 HC RX W HCPCS: Performed by: HOSPITALIST

## 2024-01-31 PROCEDURE — 80048 BASIC METABOLIC PNL TOTAL CA: CPT

## 2024-01-31 PROCEDURE — 2580000003 HC RX 258: Performed by: HOSPITALIST

## 2024-01-31 PROCEDURE — 6370000000 HC RX 637 (ALT 250 FOR IP): Performed by: HOSPITALIST

## 2024-01-31 PROCEDURE — 94640 AIRWAY INHALATION TREATMENT: CPT

## 2024-01-31 PROCEDURE — 85025 COMPLETE CBC W/AUTO DIFF WBC: CPT

## 2024-01-31 PROCEDURE — 82962 GLUCOSE BLOOD TEST: CPT

## 2024-01-31 RX ORDER — CEPHALEXIN 500 MG/1
500 CAPSULE ORAL 4 TIMES DAILY
Qty: 12 CAPSULE | Refills: 0 | Status: SHIPPED | OUTPATIENT
Start: 2024-01-31 | End: 2024-02-03

## 2024-01-31 RX ORDER — FUROSEMIDE 20 MG/1
20 TABLET ORAL DAILY
Qty: 60 TABLET | Refills: 0 | Status: SHIPPED | OUTPATIENT
Start: 2024-01-31

## 2024-01-31 RX ORDER — MIRTAZAPINE 15 MG/1
15 TABLET, FILM COATED ORAL
Qty: 30 TABLET | Refills: 3 | Status: SHIPPED | OUTPATIENT
Start: 2024-01-31

## 2024-01-31 RX ADMIN — WATER 1000 MG: 1 INJECTION INTRAMUSCULAR; INTRAVENOUS; SUBCUTANEOUS at 13:12

## 2024-01-31 RX ADMIN — ENOXAPARIN SODIUM 30 MG: 100 INJECTION SUBCUTANEOUS at 08:26

## 2024-01-31 RX ADMIN — POTASSIUM CHLORIDE 40 MEQ: 1500 TABLET, EXTENDED RELEASE ORAL at 08:27

## 2024-01-31 RX ADMIN — DEXTROSE MONOHYDRATE: 50 INJECTION, SOLUTION INTRAVENOUS at 07:19

## 2024-01-31 RX ADMIN — MIDODRINE HYDROCHLORIDE 10 MG: 5 TABLET ORAL at 12:37

## 2024-01-31 RX ADMIN — Medication 2 PUFF: at 07:22

## 2024-01-31 RX ADMIN — Medication 1000 UNITS: at 08:26

## 2024-01-31 RX ADMIN — MIDODRINE HYDROCHLORIDE 10 MG: 5 TABLET ORAL at 08:26

## 2024-01-31 NOTE — DISCHARGE INSTRUCTIONS
Monitor daily weights and keep log. If > 2lb in 24 hours or 5 pound weight gain in 5 days to take extra dose of 20 mg of Lasix  Encourage p.o. intake and if less than 50% then evaluate for diabetic supplement  Encourage out of bed to chair continue PT  Required frequent turning as per nursing home protocol to prevent worsening of sacral ulcer  Continue previous wound care for sacral ulcer and management as per wound care follow-up at the nursing home  Would recommend repeating BMP in 2 weeks to reevaluate recurrence of dehydration  Patient may need assistance with feedings at times  Encourage oral fluid intake but limited to 2 L/day

## 2024-01-31 NOTE — CARE COORDINATION
Patient is able to return to [facility] today if medically stable. He will return to Room [facility]. Nursing report can be called to [phone].    Once DC summary is in CM will fax to facility.       Nephew and Sister notified of possible discharge via phone at [phone].

## 2024-01-31 NOTE — DISCHARGE SUMMARY
11/14/2023, continue  -BP running a bit lower today, continuing IV fluids, continue monitoring, no fever no sign of pneumonia did not appear clinically to have sacral wound infection,   - Ua shows large leukocytes and 1+ bacteria, UC pending   - 1/30: improved bp this AM       * Procedures:   * No surgery found *        Consults:   None    Vitals Last 24 Hours:  Patient Vitals for the past 24 hrs:   Temp Pulse Resp BP SpO2   01/31/24 1300 98.2 °F (36.8 °C) 78 20 116/76 99 %   01/31/24 0730 98 °F (36.7 °C) 72 18 (!) 101/58 100 %   01/31/24 0723 -- 78 18 -- 100 %   01/31/24 0434 -- 88 -- (!) 97/44 100 %   01/31/24 0317 98.1 °F (36.7 °C) 89 20 (!) 79/53 100 %   01/30/24 2327 97.7 °F (36.5 °C) 88 20 (!) 101/49 100 %   01/30/24 1919 97.9 °F (36.6 °C) 89 20 (!) 125/42 99 %   01/30/24 1910 -- -- -- -- 99 %   01/30/24 1516 97.2 °F (36.2 °C) 72 18 111/61 97 %        Discharge Exam:  General: Obese but frail, chronically ill-appearing elderly male resting in bed, alert intermittently, when alert interacts with examiner, follows commands with encouragement, globally weak, does not appear to be in acute distress   Head:   Normocephalic, without obvious abnormality, atraumatic.  ENT: Mucous membranes dry, poor dentition   Eyes:   Conjunctivae/corneas clear.   Lungs: Good air movement heard on auscultation anteriorly and laterally, no crackles or wheezing.  No respiratory distress, on room air.   Heart:  Regular rate and rhythm, S1, S2 normal.   Abdomen:  Soft, no apparent tenderness to palpation bowel sounds present.  Extremities: Bilateral lower extremities 1+ edema and chronic venous stasis coloration changes, few pinpoint ulcers with no drainage or odor or induration, upper extremities radial pulses intact slight dependent edema in arms  Neurologic: Awake alert intermittently, pupils appear equal, face appears to be symmetrical, globally weak but observed to move all extremities spontaneously     Labs:    Recent Results (from

## 2024-02-01 LAB
BACTERIA SPEC CULT: NORMAL
COLONY COUNT, CNT: NORMAL
COLONY COUNT, CNT: NORMAL
Lab: NORMAL

## 2024-02-12 ENCOUNTER — HOSPITAL ENCOUNTER (OUTPATIENT)
Facility: HOSPITAL | Age: 89
Setting detail: OBSERVATION
End: 2024-02-13
Attending: EMERGENCY MEDICINE | Admitting: FAMILY MEDICINE
Payer: OTHER GOVERNMENT

## 2024-02-12 ENCOUNTER — APPOINTMENT (OUTPATIENT)
Facility: HOSPITAL | Age: 89
End: 2024-02-12
Attending: EMERGENCY MEDICINE
Payer: OTHER GOVERNMENT

## 2024-02-12 DIAGNOSIS — T17.908A ASPIRATION INTO AIRWAY, INITIAL ENCOUNTER: ICD-10-CM

## 2024-02-12 DIAGNOSIS — R09.02 HYPOXIA: Primary | ICD-10-CM

## 2024-02-12 DIAGNOSIS — E86.0 DEHYDRATION: ICD-10-CM

## 2024-02-12 PROBLEM — Z51.5 END OF LIFE CARE: Status: ACTIVE | Noted: 2024-02-12

## 2024-02-12 LAB
ALBUMIN SERPL-MCNC: 2 G/DL (ref 3.5–5)
ALBUMIN/GLOB SERPL: 0.4 (ref 1.1–2.2)
ALP SERPL-CCNC: 113 U/L (ref 45–117)
ALT SERPL-CCNC: 40 U/L (ref 12–78)
ANION GAP SERPL CALC-SCNC: 9 MMOL/L (ref 5–15)
ARTERIAL PATENCY WRIST A: YES
AST SERPL W P-5'-P-CCNC: 60 U/L (ref 15–37)
BASE DEFICIT BLDA-SCNC: 5.3 MMOL/L
BASOPHILS # BLD: 0 K/UL (ref 0–0.1)
BASOPHILS NFR BLD: 0 % (ref 0–1)
BDY SITE: ABNORMAL
BILIRUB SERPL-MCNC: 0.4 MG/DL (ref 0.2–1)
BUN SERPL-MCNC: 70 MG/DL (ref 6–20)
BUN/CREAT SERPL: 30 (ref 12–20)
CA-I BLD-MCNC: 9.2 MG/DL (ref 8.5–10.1)
CHLORIDE SERPL-SCNC: 120 MMOL/L (ref 97–108)
CO2 SERPL-SCNC: 31 MMOL/L (ref 21–32)
COHGB MFR BLD: 0.3 % (ref 1–2)
CREAT SERPL-MCNC: 2.31 MG/DL (ref 0.7–1.3)
DIFFERENTIAL METHOD BLD: ABNORMAL
EOSINOPHIL # BLD: 0.1 K/UL (ref 0–0.4)
EOSINOPHIL NFR BLD: 1 % (ref 0–7)
ERYTHROCYTE [DISTWIDTH] IN BLOOD BY AUTOMATED COUNT: 26.6 % (ref 11.5–14.5)
FLUAV RNA SPEC QL NAA+PROBE: NOT DETECTED
FLUBV RNA SPEC QL NAA+PROBE: NOT DETECTED
GAS FLOW.O2 O2 DELIVERY SYS: 2 L/MIN
GLOBULIN SER CALC-MCNC: 5.5 G/DL (ref 2–4)
GLUCOSE SERPL-MCNC: 103 MG/DL (ref 65–100)
HCO3 BLDA-SCNC: 23 MMOL/L (ref 22–26)
HCT VFR BLD AUTO: 30.5 % (ref 36.6–50.3)
HGB BLD-MCNC: 9.2 G/DL (ref 12.1–17)
IMM GRANULOCYTES # BLD AUTO: 0 K/UL (ref 0–0.04)
IMM GRANULOCYTES NFR BLD AUTO: 0 % (ref 0–0.5)
LACTATE SERPL-SCNC: 1.7 MMOL/L (ref 0.4–2)
LYMPHOCYTES # BLD: 1.3 K/UL (ref 0.8–3.5)
LYMPHOCYTES NFR BLD: 21 % (ref 12–49)
MAGNESIUM SERPL-MCNC: 2.4 MG/DL (ref 1.6–2.4)
MCH RBC QN AUTO: 25.9 PG (ref 26–34)
MCHC RBC AUTO-ENTMCNC: 30.2 G/DL (ref 30–36.5)
MCV RBC AUTO: 85.9 FL (ref 80–99)
METHGB MFR BLD: 0.4 % (ref 0–1.4)
MONOCYTES # BLD: 0.4 K/UL (ref 0–1)
MONOCYTES NFR BLD: 7 % (ref 5–13)
NEUTS SEG # BLD: 4.5 K/UL (ref 1.8–8)
NEUTS SEG NFR BLD: 71 % (ref 32–75)
NRBC # BLD: 0.09 K/UL (ref 0–0.01)
NRBC BLD-RTO: 1.4 PER 100 WBC
OXYHGB MFR BLD: 93.4 % (ref 95–99)
PCO2 BLDA: 66 MMHG (ref 35–45)
PERFORMED BY:: ABNORMAL
PH BLDA: 7.17 (ref 7.35–7.45)
PLATELET # BLD AUTO: 239 K/UL (ref 150–400)
PMV BLD AUTO: 11 FL (ref 8.9–12.9)
PO2 BLDA: 89 MMHG (ref 80–100)
POTASSIUM SERPL-SCNC: 4 MMOL/L (ref 3.5–5.1)
PROT SERPL-MCNC: 7.5 G/DL (ref 6.4–8.2)
RBC # BLD AUTO: 3.55 M/UL (ref 4.1–5.7)
RBC MORPH BLD: ABNORMAL
SAO2% DEVICE SAO2% SENSOR NAME: ABNORMAL
SARS-COV-2 RNA RESP QL NAA+PROBE: NOT DETECTED
SERVICE CMNT-IMP: ABNORMAL
SODIUM SERPL-SCNC: 160 MMOL/L (ref 136–145)
SPECIMEN SITE: ABNORMAL
WBC # BLD AUTO: 6.3 K/UL (ref 4.1–11.1)

## 2024-02-12 PROCEDURE — 96374 THER/PROPH/DIAG INJ IV PUSH: CPT

## 2024-02-12 PROCEDURE — 99285 EMERGENCY DEPT VISIT HI MDM: CPT

## 2024-02-12 PROCEDURE — 36600 WITHDRAWAL OF ARTERIAL BLOOD: CPT

## 2024-02-12 PROCEDURE — 80053 COMPREHEN METABOLIC PANEL: CPT

## 2024-02-12 PROCEDURE — 2580000003 HC RX 258: Performed by: EMERGENCY MEDICINE

## 2024-02-12 PROCEDURE — 83735 ASSAY OF MAGNESIUM: CPT

## 2024-02-12 PROCEDURE — 85025 COMPLETE CBC W/AUTO DIFF WBC: CPT

## 2024-02-12 PROCEDURE — 71045 X-RAY EXAM CHEST 1 VIEW: CPT

## 2024-02-12 PROCEDURE — G0378 HOSPITAL OBSERVATION PER HR: HCPCS

## 2024-02-12 PROCEDURE — 71250 CT THORAX DX C-: CPT

## 2024-02-12 PROCEDURE — 96361 HYDRATE IV INFUSION ADD-ON: CPT

## 2024-02-12 PROCEDURE — 2500000003 HC RX 250 WO HCPCS: Performed by: EMERGENCY MEDICINE

## 2024-02-12 PROCEDURE — 6360000002 HC RX W HCPCS: Performed by: EMERGENCY MEDICINE

## 2024-02-12 PROCEDURE — 87040 BLOOD CULTURE FOR BACTERIA: CPT

## 2024-02-12 PROCEDURE — 2700000000 HC OXYGEN THERAPY PER DAY

## 2024-02-12 PROCEDURE — 87636 SARSCOV2 & INF A&B AMP PRB: CPT

## 2024-02-12 PROCEDURE — 70450 CT HEAD/BRAIN W/O DYE: CPT

## 2024-02-12 PROCEDURE — 82803 BLOOD GASES ANY COMBINATION: CPT

## 2024-02-12 PROCEDURE — 83605 ASSAY OF LACTIC ACID: CPT

## 2024-02-12 PROCEDURE — 2580000003 HC RX 258: Performed by: FAMILY MEDICINE

## 2024-02-12 RX ORDER — SODIUM CHLORIDE 0.9 % (FLUSH) 0.9 %
5-40 SYRINGE (ML) INJECTION PRN
Status: DISCONTINUED | OUTPATIENT
Start: 2024-02-12 | End: 2024-02-13 | Stop reason: HOSPADM

## 2024-02-12 RX ORDER — 0.9 % SODIUM CHLORIDE 0.9 %
1000 INTRAVENOUS SOLUTION INTRAVENOUS ONCE
Status: COMPLETED | OUTPATIENT
Start: 2024-02-12 | End: 2024-02-12

## 2024-02-12 RX ORDER — ATROPINE SULFATE 10 MG/ML
2 SOLUTION/ DROPS OPHTHALMIC
Status: DISCONTINUED | OUTPATIENT
Start: 2024-02-12 | End: 2024-02-13 | Stop reason: HOSPADM

## 2024-02-12 RX ORDER — SODIUM CHLORIDE 0.9 % (FLUSH) 0.9 %
5-40 SYRINGE (ML) INJECTION EVERY 12 HOURS SCHEDULED
Status: DISCONTINUED | OUTPATIENT
Start: 2024-02-12 | End: 2024-02-13 | Stop reason: HOSPADM

## 2024-02-12 RX ORDER — MORPHINE SULFATE 10 MG/5ML
5 SOLUTION ORAL
Status: DISCONTINUED | OUTPATIENT
Start: 2024-02-12 | End: 2024-02-13 | Stop reason: HOSPADM

## 2024-02-12 RX ORDER — HALOPERIDOL 5 MG/ML
2 INJECTION INTRAMUSCULAR EVERY 6 HOURS PRN
Status: DISCONTINUED | OUTPATIENT
Start: 2024-02-12 | End: 2024-02-13 | Stop reason: HOSPADM

## 2024-02-12 RX ORDER — 0.9 % SODIUM CHLORIDE 0.9 %
1000 INTRAVENOUS SOLUTION INTRAVENOUS ONCE
Status: DISCONTINUED | OUTPATIENT
Start: 2024-02-12 | End: 2024-02-13 | Stop reason: HOSPADM

## 2024-02-12 RX ORDER — ONDANSETRON 2 MG/ML
4 INJECTION INTRAMUSCULAR; INTRAVENOUS EVERY 6 HOURS PRN
Status: DISCONTINUED | OUTPATIENT
Start: 2024-02-12 | End: 2024-02-13 | Stop reason: HOSPADM

## 2024-02-12 RX ORDER — 0.9 % SODIUM CHLORIDE 0.9 %
1000 INTRAVENOUS SOLUTION INTRAVENOUS ONCE
Status: DISCONTINUED | OUTPATIENT
Start: 2024-02-12 | End: 2024-02-12

## 2024-02-12 RX ORDER — SODIUM CHLORIDE 9 MG/ML
INJECTION, SOLUTION INTRAVENOUS PRN
Status: DISCONTINUED | OUTPATIENT
Start: 2024-02-12 | End: 2024-02-13 | Stop reason: HOSPADM

## 2024-02-12 RX ADMIN — SODIUM CHLORIDE 1000 ML: 9 INJECTION, SOLUTION INTRAVENOUS at 12:28

## 2024-02-12 RX ADMIN — SODIUM BICARBONATE 50 MEQ: 84 INJECTION, SOLUTION INTRAVENOUS at 13:26

## 2024-02-12 RX ADMIN — WATER 1000 MG: 1 INJECTION INTRAMUSCULAR; INTRAVENOUS; SUBCUTANEOUS at 10:15

## 2024-02-12 RX ADMIN — SODIUM CHLORIDE 1000 ML: 9 INJECTION, SOLUTION INTRAVENOUS at 10:30

## 2024-02-12 RX ADMIN — SODIUM CHLORIDE, PRESERVATIVE FREE 10 ML: 5 INJECTION INTRAVENOUS at 20:52

## 2024-02-12 NOTE — ED PROVIDER NOTES
[facility] EMERGENCY DEPT  EMERGENCY DEPARTMENT HISTORY AND PHYSICAL EXAM      Date: 2/12/2024  Patient Name: Chriss Hooker  MRN: [id number]  YOB: 1935  Date of evaluation: 2/12/2024  Provider: Mer Bryson MD   Note Started: 9:43 AM EST 2/12/24    HISTORY OF PRESENT ILLNESS     Chief Complaint   Patient presents with    Altered Mental Status       History Provided By: Patient    HPI: Chriss Hooker is a 88 y.o. male     PAST MEDICAL HISTORY   Past Medical History:  Past Medical History:   Diagnosis Date    HFrEF (heart failure with reduced ejection fraction) ([facility])     Hypercholesteremia     Morbid obesity (HCC)     Presence of cardiac pacemaker 01/01/2020    Type 2 diabetes mellitus without complication, with long-term current use of insulin ([facility])     Venous stasis dermatitis of both lower extremities        Past Surgical History:  History reviewed. No pertinent surgical history.    Family History:  History reviewed. No pertinent family history.    Social History:  Social History     Tobacco Use    Smoking status: Never    Smokeless tobacco: Never   Vaping Use    Vaping Use: Never used   Substance Use Topics    Alcohol use: Not Currently    Drug use: Never       Allergies:  No Known Allergies    PCP: Andrew Perez MD    Current Meds:   Current Facility-Administered Medications   Medication Dose Route Frequency Provider Last Rate Last Admin    sodium chloride 0.9 % bolus 1,000 mL  1,000 mL IntraVENous Once Mer Bryson MD        cefTRIAXone (ROCEPHIN) 1,000 mg in sterile water 10 mL IV syringe  1,000 mg IntraVENous NOW Mer Bryson MD         Current Outpatient Medications   Medication Sig Dispense Refill    mirtazapine (REMERON) 15 MG tablet Take 1 tablet by mouth nightly 30 tablet 3    furosemide (LASIX) 20 MG tablet Take 1 tablet by mouth daily 60 tablet 0    senna (SENOKOT) 8.6 MG tablet Take 1 tablet by mouth nightly      docusate sodium (COLACE) 100 MG capsule Take 1 capsule

## 2024-02-12 NOTE — ACP (ADVANCE CARE PLANNING)
Received consult for Hospice referral. Patient admitted from nursing facility and prognosis is poor. ACP on file and Latete Morro is MPOA. Education provided on hospice services. [place] of choice provided. Mr. Hooker had no preference. Referral placed to [facility] [phone]. Full CM assessment to follow.     RAMY MatamorosW

## 2024-02-12 NOTE — PROGRESS NOTES
TRANSFER - IN REPORT:    Verbal report received from NANCY BRICENO R.N on Chriss Hooker  being received from ED for routine progression of patient care      Report consisted of patient's Situation, Background, Assessment and   Recommendations(SBAR).     Information from the following report(s) Nurse Handoff Report was reviewed with the receiving nurse.    Opportunity for questions and clarification was provided.      Assessment completed upon patient's arrival to unit and care assumed.

## 2024-02-12 NOTE — ED TRIAGE NOTES
Patient presents from [facility] and rehab due to altered mental status patient will open eyes but will not speak patient presented to EMS with low oxygenation and low temperature.

## 2024-02-12 NOTE — H&P
end-of-life care  As above discussed with patient's nephew and sister, family would like for us to keep patient comfortable  Expect patient would pass away imminently    2.  Hypernatremia  Some IV fluids given, comfort measures only now    3.  VIANEY CKD  Some fluids given, comfort measures only now    4.  Hypoxemia  Apply supplemental oxygen only if it helps patient's comfort level  Morphine for any sign of air hunger/dyspnea/tachypnea    5.  Aspiration  Based on CT patient probably aspirating for quite some time into the right lung with extensive debris noted  No treatment to be given, comfort measures only, patient would require intubation if aggressive management were to be pursued, family does not wish for these measures to be pursued and are agreeable on comfort measures only    6.  Chronic combined systolic diastolic CHF  Has a pacer (defibrillator?) in place  Request staff to obtain magnet to deactivate defibrillator function if present --> reviewed a prior cardiology note, device PPM only (?) but we do not have actual info about the device, when/where placed, etc; reviewed CXR images and I'm not sure --> spoke to staff, will contact cardiology outpt techs here to deactivate if defib and they may have additional records     DNR, DNI, comfort measures only  Prognosis on the order of hours to days  Family: ADRIAN Tariq           Electronically signed by Rajwinder Beltran MD on 2/12/2024 at 2:30 PM

## 2024-02-12 NOTE — PLAN OF CARE
Problem: Skin/Tissue Integrity  Goal: Absence of new skin breakdown  Description: 1.  Monitor for areas of redness and/or skin breakdown  2.  Assess vascular access sites hourly  3.  Every 4-6 hours minimum:  Change oxygen saturation probe site  4.  Every 4-6 hours:  If on nasal continuous positive airway pressure, respiratory therapy assess nares and determine need for appliance change or resting period.  Outcome: Progressing     Problem: ABCDS Injury Assessment  Goal: Absence of physical injury  Outcome: Progressing  Flowsheets (Taken 2/12/2024 3274)  Absence of Physical Injury: Implement safety measures based on patient assessment     Problem: Safety - Adult  Goal: Free from fall injury  Outcome: Progressing

## 2024-02-12 NOTE — ED NOTES
Rectal temp 87.2, attempted to straight cath for urine specimen and unable to obtain urine. Dr Nieto nt.

## 2024-02-12 NOTE — ED NOTES
Dr Beltran at bedside to assess patient. Spoke to family member listed and discussed plan of care.

## 2024-02-13 VITALS
BODY MASS INDEX: 39.96 KG/M2 | HEIGHT: 72 IN | HEART RATE: 51 BPM | TEMPERATURE: 95.3 F | WEIGHT: 295 LBS | SYSTOLIC BLOOD PRESSURE: 62 MMHG | OXYGEN SATURATION: 95 % | DIASTOLIC BLOOD PRESSURE: 39 MMHG | RESPIRATION RATE: 15 BRPM

## 2024-02-13 PROCEDURE — 2700000000 HC OXYGEN THERAPY PER DAY

## 2024-02-13 PROCEDURE — G0378 HOSPITAL OBSERVATION PER HR: HCPCS

## 2024-02-13 PROCEDURE — 94760 N-INVAS EAR/PLS OXIMETRY 1: CPT

## 2024-02-13 NOTE — PROGRESS NOTES
Bedside shift change report given to Shelly NGO (oncoming nurse) by Idania Winston LPN (offgoing nurse). Report included the following information Nurse Handoff Report.

## 2024-02-13 NOTE — PROGRESS NOTES
Bedside shift change report given to karol victor(oncoming nurse) by yomaira cortes (offgoing nurse). Report included the following information Nurse Handoff Report.

## 2024-02-13 NOTE — PROGRESS NOTES
End of Shift Note    Bedside shift change report given to Idania NGO (oncoming nurse) by Shelly Her LPN (offgoing nurse).  Report included the following information Kardex    Shift worked:  7 pm     Shift summary and any significant changes:     Patient status unchanged. On Comfort Care.     Concerns for physician to address:       Zone phone for oncoming shift:          Activity:     Number times ambulated in hallways past shift: 0  Number of times OOB to chair past shift: 0    Cardiac:   Cardiac Monitoring: No           Access:  Current line(s): PIV     Genitourinary:   Urinary status: incontinent    Respiratory:      Chronic home O2 use?: NO  Incentive spirometer at bedside: NO       GI:     Current diet:  No diet orders on file  Passing flatus: NO  Tolerating current diet: YES       Pain Management:   Patient states pain is manageable on current regimen: NO    Skin:     Interventions: float heels    Patient Safety:  Fall Score:    Interventions:        Length of Stay:  Expected LOS: 2  Actual LOS: 0      Shelly Her LPN

## 2024-02-13 NOTE — DISCHARGE SUMMARY
around 7:05 AM.         Vitals Last 24 Hours:  Patient Vitals for the past 24 hrs:   Temp Pulse Resp BP SpO2   24 0426 -- 51 -- -- --   24 1917 (!) 95.3 °F (35.2 °C) 68 15 (!) 62/39 95 %   24 1711 -- -- -- -- 98 %   24 1518 (!) 92.4 °F (33.6 °C) 62 15 (!) 62/36 96 %   24 1452 -- 65 15 -- 90 %   24 1445 (!) 88.9 °F (31.6 °C) -- -- -- --   24 1339 -- 62 19 (!) 64/46 94 %   24 1309 -- 60 21 (!) 75/49 93 %   24 1245 (!) 87.2 °F (30.7 °C) -- -- -- --   24 1239 -- 64 18 (!) 85/49 97 %   24 1215 -- -- -- (!) 92/49 --   24 1139 -- 66 17 (!) 84/51 95 %   24 1109 -- 66 20 (!) 76/45 97 %   24 1039 -- 70 -- (!) 89/48 98 %   24 1010 -- 71 -- (!) 102/36 --   24 1004 -- 62 -- (!) 87/44 (!) 89 %   24 0941 -- 68 28 (!) 76/47 93 %        Physical exam:  Elderly male lying in hospital bed, pale appearance, completely unresponsive  On observation, no chest rise  On auscultation, no air entry, no heart tones  On palpation, no right radial pulse, no left carotid pulse  Pupils fixed and dilated     Patient is .  Time of death 7:05 AM.          Signed:  Rajwinder Beltran MD  2024  7:16 AM

## 2024-02-13 NOTE — PROGRESS NOTES
[facility] well[facility]

## 2024-02-13 NOTE — PLAN OF CARE
Problem: Skin/Tissue Integrity  Goal: Absence of new skin breakdown  Description: 1.  Monitor for areas of redness and/or skin breakdown  2.  Assess vascular access sites hourly  3.  Every 4-6 hours minimum:  Change oxygen saturation probe site  4.  Every 4-6 hours:  If on nasal continuous positive airway pressure, respiratory therapy assess nares and determine need for appliance change or resting period.  Outcome: Completed     Problem: ABCDS Injury Assessment  Goal: Absence of physical injury  Outcome: Completed     Problem: Safety - Adult  Goal: Free from fall injury  Outcome: Completed     Problem: Chronic Conditions and Co-morbidities  Goal: Patient's chronic conditions and co-morbidity symptoms are monitored and maintained or improved  Outcome: Completed

## 2024-02-18 LAB
BACTERIA SPEC CULT: NORMAL
BACTERIA SPEC CULT: NORMAL
Lab: NORMAL
Lab: NORMAL